# Patient Record
Sex: MALE | Race: BLACK OR AFRICAN AMERICAN | NOT HISPANIC OR LATINO | Employment: OTHER | URBAN - METROPOLITAN AREA
[De-identification: names, ages, dates, MRNs, and addresses within clinical notes are randomized per-mention and may not be internally consistent; named-entity substitution may affect disease eponyms.]

---

## 2021-01-07 ENCOUNTER — APPOINTMENT (OUTPATIENT)
Dept: LAB | Facility: CLINIC | Age: 66
End: 2021-01-07
Payer: MEDICARE

## 2021-01-07 ENCOUNTER — OFFICE VISIT (OUTPATIENT)
Dept: INTERNAL MEDICINE CLINIC | Facility: CLINIC | Age: 66
End: 2021-01-07
Payer: MEDICARE

## 2021-01-07 VITALS
TEMPERATURE: 96.8 F | DIASTOLIC BLOOD PRESSURE: 82 MMHG | WEIGHT: 201.6 LBS | HEIGHT: 70 IN | RESPIRATION RATE: 16 BRPM | HEART RATE: 64 BPM | BODY MASS INDEX: 28.86 KG/M2 | SYSTOLIC BLOOD PRESSURE: 128 MMHG

## 2021-01-07 DIAGNOSIS — Z23 ENCOUNTER FOR IMMUNIZATION: ICD-10-CM

## 2021-01-07 DIAGNOSIS — Z11.4 ENCOUNTER FOR SCREENING FOR HIV: ICD-10-CM

## 2021-01-07 DIAGNOSIS — F17.210 CIGARETTE NICOTINE DEPENDENCE WITHOUT COMPLICATION: ICD-10-CM

## 2021-01-07 DIAGNOSIS — M25.551 RIGHT HIP PAIN: ICD-10-CM

## 2021-01-07 DIAGNOSIS — Z00.00 HEALTHCARE MAINTENANCE: ICD-10-CM

## 2021-01-07 DIAGNOSIS — Z12.5 PROSTATE CANCER SCREENING: ICD-10-CM

## 2021-01-07 DIAGNOSIS — Z11.59 ENCOUNTER FOR HEPATITIS C SCREENING TEST FOR LOW RISK PATIENT: ICD-10-CM

## 2021-01-07 DIAGNOSIS — K43.9 VENTRAL HERNIA WITHOUT OBSTRUCTION OR GANGRENE: Primary | ICD-10-CM

## 2021-01-07 DIAGNOSIS — Z23 NEED FOR INFLUENZA VACCINATION: ICD-10-CM

## 2021-01-07 DIAGNOSIS — Z23 NEED FOR STREPTOCOCCUS PNEUMONIAE VACCINATION: ICD-10-CM

## 2021-01-07 DIAGNOSIS — M54.50 ACUTE RIGHT-SIDED LOW BACK PAIN WITHOUT SCIATICA: ICD-10-CM

## 2021-01-07 PROBLEM — K42.9 UMBILICAL HERNIA: Status: ACTIVE | Noted: 2021-01-07

## 2021-01-07 LAB
ALBUMIN SERPL BCP-MCNC: 4 G/DL (ref 3.5–5)
ALP SERPL-CCNC: 90 U/L (ref 46–116)
ALT SERPL W P-5'-P-CCNC: 33 U/L (ref 12–78)
ANION GAP SERPL CALCULATED.3IONS-SCNC: 2 MMOL/L (ref 4–13)
AST SERPL W P-5'-P-CCNC: 19 U/L (ref 5–45)
BILIRUB SERPL-MCNC: 0.59 MG/DL (ref 0.2–1)
BUN SERPL-MCNC: 16 MG/DL (ref 5–25)
CALCIUM SERPL-MCNC: 9.5 MG/DL (ref 8.3–10.1)
CHLORIDE SERPL-SCNC: 110 MMOL/L (ref 100–108)
CHOLEST SERPL-MCNC: 181 MG/DL (ref 50–200)
CO2 SERPL-SCNC: 29 MMOL/L (ref 21–32)
CREAT SERPL-MCNC: 1.09 MG/DL (ref 0.6–1.3)
ERYTHROCYTE [DISTWIDTH] IN BLOOD BY AUTOMATED COUNT: 15.2 % (ref 11.6–15.1)
GFR SERPL CREATININE-BSD FRML MDRD: 81 ML/MIN/1.73SQ M
GLUCOSE SERPL-MCNC: 105 MG/DL (ref 65–140)
HCT VFR BLD AUTO: 42.7 % (ref 36.5–49.3)
HCV AB SER QL: NORMAL
HDLC SERPL-MCNC: 55 MG/DL
HGB BLD-MCNC: 13.6 G/DL (ref 12–17)
LDLC SERPL CALC-MCNC: 113 MG/DL (ref 0–100)
MCH RBC QN AUTO: 28.2 PG (ref 26.8–34.3)
MCHC RBC AUTO-ENTMCNC: 31.9 G/DL (ref 31.4–37.4)
MCV RBC AUTO: 89 FL (ref 82–98)
PLATELET # BLD AUTO: 288 THOUSANDS/UL (ref 149–390)
PMV BLD AUTO: 11.2 FL (ref 8.9–12.7)
POTASSIUM SERPL-SCNC: 4.7 MMOL/L (ref 3.5–5.3)
PROT SERPL-MCNC: 8.3 G/DL (ref 6.4–8.2)
PSA SERPL-MCNC: 3.6 NG/ML (ref 0–4)
RBC # BLD AUTO: 4.82 MILLION/UL (ref 3.88–5.62)
SODIUM SERPL-SCNC: 141 MMOL/L (ref 136–145)
TRIGL SERPL-MCNC: 64 MG/DL
WBC # BLD AUTO: 4.81 THOUSAND/UL (ref 4.31–10.16)

## 2021-01-07 PROCEDURE — 85027 COMPLETE CBC AUTOMATED: CPT

## 2021-01-07 PROCEDURE — G0009 ADMIN PNEUMOCOCCAL VACCINE: HCPCS | Performed by: NURSE PRACTITIONER

## 2021-01-07 PROCEDURE — 99204 OFFICE O/P NEW MOD 45 MIN: CPT | Performed by: NURSE PRACTITIONER

## 2021-01-07 PROCEDURE — 36415 COLL VENOUS BLD VENIPUNCTURE: CPT

## 2021-01-07 PROCEDURE — G0008 ADMIN INFLUENZA VIRUS VAC: HCPCS | Performed by: NURSE PRACTITIONER

## 2021-01-07 PROCEDURE — G0103 PSA SCREENING: HCPCS

## 2021-01-07 PROCEDURE — 86803 HEPATITIS C AB TEST: CPT

## 2021-01-07 PROCEDURE — 80053 COMPREHEN METABOLIC PANEL: CPT

## 2021-01-07 PROCEDURE — 90670 PCV13 VACCINE IM: CPT | Performed by: NURSE PRACTITIONER

## 2021-01-07 PROCEDURE — 87389 HIV-1 AG W/HIV-1&-2 AB AG IA: CPT

## 2021-01-07 PROCEDURE — 90662 IIV NO PRSV INCREASED AG IM: CPT | Performed by: NURSE PRACTITIONER

## 2021-01-07 PROCEDURE — 80061 LIPID PANEL: CPT

## 2021-01-07 NOTE — ASSESSMENT & PLAN NOTE
The patient with a large ventral hernia  He had seen a previous provider who informed the patient that if he is asymptomatic nothing needs to be done  The patient is now interested in seeing a general surgeon to discuss surgical repair    Referral to general surgery was initiated

## 2021-01-07 NOTE — ASSESSMENT & PLAN NOTE
The patient complaining of right lumbar pain which radiates to the hip  He is unsure if the pain initiates in the hip or the back  He denies any mechanism of injury  An x-ray of the lumbar spine has been ordered

## 2021-01-07 NOTE — ASSESSMENT & PLAN NOTE
The patient has a several month history of right hip and upper thigh pain  He does not remember any mechanism of injury  He does take Tylenol or Motrin for this which does relieve the pain  Patient is a 48 year smoker  I did recommend to the patient that we get an x-ray of that right hip to rule out any osteo arthritis or bone issues

## 2021-01-07 NOTE — PROGRESS NOTES
the patient was not eligible for their annual wellness visit at this time Assessment and Plan:     Problem List Items Addressed This Visit     None      Visit Diagnoses     Encounter for immunization    -  Primary    Relevant Orders    influenza vaccine, high-dose, PF 0 7 mL (FLUZONE HIGH-DOSE)        BMI Counseling: Body mass index is 28 93 kg/m²  The BMI is above normal  Nutrition recommendations include encouraging healthy choices of fruits and vegetables, moderation in carbohydrate intake and reducing intake of cholesterol  Exercise recommendations include exercising 3-5 times per week  No pharmacotherapy was ordered  Tobacco Cessation Counseling: Tobacco cessation counseling was provided  The patient is sincerely urged to quit consumption of tobacco  He is not ready to quit tobacco  Medication options and side effects of medication discussed  Preventive health issues were discussed with patient, and age appropriate screening tests were ordered as noted in patient's After Visit Summary  Personalized health advice and appropriate referrals for health education or preventive services given if needed, as noted in patient's After Visit Summary  History of Present Illness:     Patient presents for Medicare Annual Wellness visit    Patient Care Team:  Sandra Fleming DO as PCP - General (Family Medicine)     Problem List:     There is no problem list on file for this patient  Past Medical and Surgical History:     History reviewed  No pertinent past medical history  History reviewed  No pertinent surgical history     Family History:     Family History   Problem Relation Age of Onset    No Known Problems Mother     No Known Problems Father     No Known Problems Brother     No Known Problems Maternal Grandmother     No Known Problems Maternal Grandfather     No Known Problems Paternal Grandmother     No Known Problems Paternal Grandfather       Social History:     E-Cigarette/Vaping    E-Cigarette Use Never User      E-Cigarette/Vaping Substances    Nicotine No     THC No     CBD No     Flavoring No     Other No     Unknown No      Social History     Socioeconomic History    Marital status: /Civil Union     Spouse name: None    Number of children: None    Years of education: None    Highest education level: None   Occupational History    None   Social Needs    Financial resource strain: None    Food insecurity     Worry: None     Inability: None    Transportation needs     Medical: None     Non-medical: None   Tobacco Use    Smoking status: Current Every Day Smoker     Types: Cigarettes    Smokeless tobacco: Never Used   Substance and Sexual Activity    Alcohol use: Yes     Frequency: 2-3 times a week     Comment: once a week    Drug use: Never    Sexual activity: Not Currently   Lifestyle    Physical activity     Days per week: 0 days     Minutes per session: 0 min    Stress: Not at all   Relationships    Social connections     Talks on phone: None     Gets together: None     Attends Mandaeism service: None     Active member of club or organization: None     Attends meetings of clubs or organizations: None     Relationship status: None    Intimate partner violence     Fear of current or ex partner: None     Emotionally abused: None     Physically abused: None     Forced sexual activity: None   Other Topics Concern    None   Social History Narrative    None      Medications and Allergies:     No current outpatient medications on file  No current facility-administered medications for this visit  No Known Allergies   Immunizations: There is no immunization history for the selected administration types on file for this patient     Health Maintenance:         Topic Date Due    Hepatitis C Screening  1955    Colorectal Cancer Screening  06/04/2004         Topic Date Due    DTaP,Tdap,and Td Vaccines (1 - Tdap) 06/04/1975    Pneumococcal Vaccine: 65+ Years (1 of 1 - PPSV23) 06/04/2019    Influenza Vaccine (1) 09/01/2020      Medicare Health Risk Assessment:     There were no vitals taken for this visit  Sakina Neff is here for his Initial Wellness visit  Health Risk Assessment:   Patient rates overall health as good  Patient feels that their physical health rating is slightly worse  Eyesight was rated as same  Hearing was rated as same  Patient feels that their emotional and mental health rating is same  Pain experienced in the last 7 days has been some  Patient states that he has experienced no weight loss or gain in last 6 months  Depression Screening:   PHQ-2 Score: 0      Fall Risk Screening: In the past year, patient has experienced: no history of falling in past year      Home Safety:  Patient does not have trouble with stairs inside or outside of their home  Patient has working smoke alarms and has working carbon monoxide detector  Home safety hazards include: none  Nutrition:   Current diet is Regular  Medications:   Patient is not currently taking any over-the-counter supplements  Patient is able to manage medications  Activities of Daily Living (ADLs)/Instrumental Activities of Daily Living (IADLs):   Walk and transfer into and out of bed and chair?: Yes  Dress and groom yourself?: Yes    Bathe or shower yourself?: Yes    Feed yourself?  Yes  Do your laundry/housekeeping?: Yes  Manage your money, pay your bills and track your expenses?: Yes  Make your own meals?: Yes    Do your own shopping?: Yes    Previous Hospitalizations:   Any hospitalizations or ED visits within the last 12 months?: No      Advance Care Planning:   Living will: No    Durable POA for healthcare: No    Five wishes given: Yes      Cognitive Screening:   Provider or family/friend/caregiver concerned regarding cognition?: No    PREVENTIVE SCREENINGS      Cardiovascular Screening:      Due for: Lipid Panel      Diabetes Screening:       Due for: Blood Glucose Prostate Cancer Screening:      Due for: PSA      Osteoporosis Screening:    General: Patient Declines      Abdominal Aortic Aneurysm (AAA) Screening:    Risk factors include: age between 73-69 yo and tobacco use      Due for: Screening AAA Ultrasound      Lung Cancer Screening:     General: Screening Not Indicated      EBEN Jensen

## 2021-01-07 NOTE — PROGRESS NOTES
INTERNAL MEDICINE INITIAL OFFICE VISIT  [facility]    NAME: Elva Ivy  AGE: 76 y o  SEX: male  : 1955     DATE: 2021     Assessment and Plan:     Problem List Items Addressed This Visit        Other    Right hip pain       The patient has a several month history of right hip and upper thigh pain  He does not remember any mechanism of injury  He does take Tylenol or Motrin for this which does relieve the pain  Patient is a 48 year smoker  I did recommend to the patient that we get an x-ray of that right hip to rule out any osteo arthritis or bone issues  Relevant Orders    XR hip/pelv 2-3 vws right if performed    Ventral hernia without obstruction or gangrene - Primary       The patient with a large ventral hernia  He had seen a previous provider who informed the patient that if he is asymptomatic nothing needs to be done  The patient is now interested in seeing a general surgeon to discuss surgical repair  Referral to general surgery was initiated         Relevant Orders    Ambulatory referral to General Surgery    Acute right-sided low back pain without sciatica      The patient complaining of right lumbar pain which radiates to the hip  He is unsure if the pain initiates in the hip or the back  He denies any mechanism of injury  An x-ray of the lumbar spine has been ordered  Relevant Orders    XR spine lumbar minimum 4 views non injury    Cigarette nicotine dependence without complication       The patient has been smoking cigarettes for approximately 50 years  He states he now only smokes approximately 3-4 cigarettes a day  He is not interested in smoking cessation  He does have a brother who  from lung cancer at the age of 52  His brother was a nonsmoker             Other Visit Diagnoses     Encounter for immunization        Relevant Orders    PNEUMOCOCCAL CONJUGATE VACCINE 13-VALENT GREATER THAN 6 MONTHS (Completed)    Prostate cancer screening        Relevant Orders    PSA, Total Screen    Healthcare maintenance        Relevant Orders    CBC    Comprehensive metabolic panel    Lipid Panel with Direct LDL reflex    Need for influenza vaccination        Relevant Orders    influenza vaccine, high-dose, PF 0 7 mL (FLUZONE HIGH-DOSE) (Completed)    Need for Streptococcus pneumoniae vaccination        Encounter for hepatitis C screening test for low risk patient        Relevant Orders    Hepatitis C antibody    Encounter for screening for HIV        Relevant Orders    HIV 1/2 Antigen/Antibody (4th Generation) w Reflex [facility]          Return in about 6 months (around 7/7/2021) for jose luis Mckeon  Chief Complaint:     Chief Complaint   Patient presents with    Establish Care    Back Pain    Hernia      History of Present Illness:     [place] to the office today to establish care  He has not seen a provider in many years  The patient is overdue for both vision and dental exams  The patient eats a fairly healthy diet  His BMI is slightly elevated at 28  The patient is up-to-date on colonoscopies and we will contact his gastroenterologist to obtain his last report from 2017  The patient did receive both an influenza vaccine as well as a pneumonia vaccine in the office today  The patient continues to smoke and is not interested in smoking cessation  He smokes approximately 3-4 cigarettes a day  The patient also drinks alcohol 2-3 days a week  He states he will drink either 3-4 cans of beer or 3 glasses of wine  The patient has a large ventral hernia and I have referred him to General surgery for recommendations for treatment  Blood work has been ordered for the patient  An x-ray of the lumbar spine and the right hip has also been ordered  I will contact the patient with those results      The following portions of the patient's history were reviewed and updated as appropriate: allergies, current medications, past family history, past medical history, past social history, past surgical history and problem list      Review of Systems:     Review of Systems   Constitutional: Negative  Negative for fatigue  HENT: Negative  Negative for congestion, postnasal drip, rhinorrhea and trouble swallowing  Eyes: Negative  Negative for visual disturbance  Respiratory: Negative  Negative for choking and shortness of breath  Cardiovascular: Negative  Negative for chest pain  Gastrointestinal: Positive for abdominal distention (hernia)  Endocrine: Negative  Genitourinary: Negative  Musculoskeletal: Positive for arthralgias and back pain  Negative for myalgias and neck pain  Skin: Negative  Neurological: Negative for dizziness and headaches  Psychiatric/Behavioral: Negative  Past Medical History:   History reviewed  No pertinent past medical history  Past Surgical History:   History reviewed  No pertinent surgical history  Social History:     Social History     Socioeconomic History    Marital status: /Civil Union     Spouse name: None    Number of children: None    Years of education: None    Highest education level: None   Occupational History    None   Social Needs    Financial resource strain: None    Food insecurity     Worry: None     Inability: None    Transportation needs     Medical: None     Non-medical: None   Tobacco Use    Smoking status: Current Every Day Smoker     Years: 50 00     Types: Cigarettes    Smokeless tobacco: Never Used    Tobacco comment: 2-3 cigarettes a day   Substance and Sexual Activity    Alcohol use:  Yes     Alcohol/week: 6 0 standard drinks     Types: 3 Cans of beer, 3 Glasses of wine per week     Frequency: 2-3 times a week     Drinks per session: 3 or 4     Comment: once a week    Drug use: Never    Sexual activity: Not Currently   Lifestyle    Physical activity     Days per week: 0 days     Minutes per session: 0 min    Stress: Not at all   Relationships    Social connections     Talks on phone: None     Gets together: None     Attends Yarsani service: None     Active member of club or organization: None     Attends meetings of clubs or organizations: None     Relationship status: None    Intimate partner violence     Fear of current or ex partner: None     Emotionally abused: None     Physically abused: None     Forced sexual activity: None   Other Topics Concern    None   Social History Narrative    None         Family History:     Family History   Problem Relation Age of Onset    No Known Problems Mother     No Known Problems Father     No Known Problems Brother     No Known Problems Maternal Grandmother     No Known Problems Maternal Grandfather     No Known Problems Paternal Grandmother     No Known Problems Paternal Grandfather         Current Medications:   No current outpatient medications on file  Allergies:   No Known Allergies     Physical Exam:     /82 (BP Location: Left arm, Patient Position: Sitting, Cuff Size: Standard)   Pulse 64   Temp (!) 96 8 °F (36 °C) (Tympanic)   Resp 16   Ht 5' 10" (1 778 m)   Wt 91 4 kg (201 lb 9 6 oz)   BMI 28 93 kg/m²     Physical Exam  Vitals signs reviewed  Constitutional:       General: He is not in acute distress  Appearance: Normal appearance  He is well-developed  HENT:      Head: Normocephalic and atraumatic  Right Ear: Tympanic membrane, ear canal and external ear normal  There is no impacted cerumen  Left Ear: External ear normal  There is impacted cerumen  Nose: Nose normal       Mouth/Throat:      Mouth: Mucous membranes are moist       Pharynx: No oropharyngeal exudate  Eyes:      General:         Right eye: No discharge  Left eye: No discharge  Conjunctiva/sclera: Conjunctivae normal       Pupils: Pupils are equal, round, and reactive to light     Neck:      Musculoskeletal: Normal range of motion and neck supple  Thyroid: No thyromegaly  Vascular: No JVD  Trachea: No tracheal deviation  Cardiovascular:      Rate and Rhythm: Normal rate and regular rhythm  Pulses: Normal pulses  Heart sounds: Normal heart sounds  No murmur  Pulmonary:      Effort: Pulmonary effort is normal  No respiratory distress  Breath sounds: Normal breath sounds  No wheezing  Abdominal:      General: Bowel sounds are normal  There is no distension  Palpations: Abdomen is soft  There is no mass  Tenderness: There is no abdominal tenderness  There is no guarding or rebound  Hernia: A hernia (ventral) is present  Musculoskeletal: Normal range of motion  General: Tenderness (right lumbar/right hip) present  Lymphadenopathy:      Cervical: No cervical adenopathy  Skin:     General: Skin is warm and dry  Capillary Refill: Capillary refill takes less than 2 seconds  Neurological:      Mental Status: He is alert and oriented to person, place, and time  Psychiatric:         Mood and Affect: Mood normal          Behavior: Behavior normal          Thought Content: Thought content normal          Judgment: Judgment normal      BMI Counseling: Body mass index is 28 93 kg/m²  The BMI is above normal  Nutrition recommendations include decreasing portion sizes, consuming healthier snacks, limiting drinks that contain sugar, moderation in carbohydrate intake and increasing intake of lean protein  Exercise recommendations include exercising 3-5 times per week  No pharmacotherapy was ordered  Tobacco Cessation Counseling: Tobacco cessation counseling was not provided  The patient is sincerely urged to quit consumption of tobacco  He is not ready to quit tobacco  Not interested in smoking cessation      Patient Instructions   Debrox ear drops to left ear for two weeks        EBEN Richardson  [facility] [address] [place] [address]

## 2021-01-07 NOTE — ASSESSMENT & PLAN NOTE
The patient has been smoking cigarettes for approximately 50 years  He states he now only smokes approximately 3-4 cigarettes a day  He is not interested in smoking cessation  He does have a brother who  from lung cancer at the age of 52  His brother was a nonsmoker

## 2021-01-08 ENCOUNTER — TELEPHONE (OUTPATIENT)
Dept: ADMINISTRATIVE | Facility: OTHER | Age: 66
End: 2021-01-08

## 2021-01-08 LAB — HIV 1+2 AB+HIV1 P24 AG SERPL QL IA: NORMAL

## 2021-01-08 NOTE — LETTER
Procedure Request Form: Colonoscopy      Date Requested: 21  Patient: [place] Eisen[place]  Patient : 1955   Referring Provider: Dyan Arauz, DO        Date of Procedure ______________________________       The above patient has informed us that they have completed their   most recent Colonoscopy at your facility  Please complete   this form and attach all corresponding procedure reports/results  Comments __________________________________________________________  ____________________________________________________________________  ____________________________________________________________________  ____________________________________________________________________    Facility Completing Procedure _________________________________________    Form Completed By (print name) _______________________________________      Signature __________________________________________________________      These reports are needed for  compliance    Please fax this completed form and a copy of the procedure report to our office located at [place] [address] as soon as possible to [phone] [facility] [place]: Phone [phone]    We thank you for your assistance in treating our mutual patient

## 2021-01-08 NOTE — TELEPHONE ENCOUNTER
Upon review of the In Basket request and the patient's chart, initial outreach has been made via fax, please see Contacts section for details       Thank you  Cassie Saint

## 2021-01-11 NOTE — TELEPHONE ENCOUNTER
Upon review of the In Basket request we were told that there is no record of patient at the practice  Any additional questions or concerns should be emailed to the Practice Liaisons via [email or web address] email, please do not reply via In Basket      Thank you  Arturo Ellington

## 2021-01-12 ENCOUNTER — TELEPHONE (OUTPATIENT)
Dept: INTERNAL MEDICINE CLINIC | Facility: CLINIC | Age: 66
End: 2021-01-12

## 2021-01-12 NOTE — TELEPHONE ENCOUNTER
----- Message from Guadalupe Ruiz [place] sent at 1/12/2021  8:25 AM EST -----  Please call the patient make him aware that his blood work is back and is within normal limits  The only abnormal is his LDL which is his bad cholesterol is slightly elevated  He should watch his intake of fatty foods

## 2021-01-16 NOTE — TELEPHONE ENCOUNTER
----- Message from Stephany Solis sent at 1/7/2021  3:22 PM EST -----  Regarding: Colonoscopy  01/07/21 3:23 PM    Hello, our patient Adelaida Hamilton has had CRC: Colonoscopy completed/performed  Please assist in updating the patient chart by making an External outreach to Dr Gildardo Matute facility located in [place]  The date of service is 2017      Thank you,  Stephany Oliver 63 normal...

## 2021-02-15 ENCOUNTER — CONSULT (OUTPATIENT)
Dept: SURGERY | Facility: CLINIC | Age: 66
End: 2021-02-15
Payer: MEDICARE

## 2021-02-15 ENCOUNTER — TRANSCRIBE ORDERS (OUTPATIENT)
Dept: ADMINISTRATIVE | Facility: HOSPITAL | Age: 66
End: 2021-02-15

## 2021-02-15 VITALS
HEIGHT: 70 IN | DIASTOLIC BLOOD PRESSURE: 80 MMHG | TEMPERATURE: 97 F | BODY MASS INDEX: 29.78 KG/M2 | WEIGHT: 208 LBS | SYSTOLIC BLOOD PRESSURE: 130 MMHG | HEART RATE: 69 BPM

## 2021-02-15 DIAGNOSIS — R19.00 MASS OF SOFT TISSUE OF ABDOMEN: Primary | ICD-10-CM

## 2021-02-15 DIAGNOSIS — R19.00 INTRA-ABDOMINAL AND PELVIC SWELLING, MASS AND LUMP, UNSPECIFIED SITE: ICD-10-CM

## 2021-02-15 DIAGNOSIS — D19.7 BENIGN NEOPLASM OF MESOTHELIAL TISSUE OF OTHER SITES: ICD-10-CM

## 2021-02-15 DIAGNOSIS — K43.9 VENTRAL HERNIA WITHOUT OBSTRUCTION OR GANGRENE: ICD-10-CM

## 2021-02-15 DIAGNOSIS — D19.7 BENIGN NEOPLASM OF MESOTHELIAL TISSUE OF OTHER SITES: Primary | ICD-10-CM

## 2021-02-15 DIAGNOSIS — K42.0 INCARCERATED UMBILICAL HERNIA: ICD-10-CM

## 2021-02-15 PROCEDURE — 99203 OFFICE O/P NEW LOW 30 MIN: CPT | Performed by: SURGERY

## 2021-02-15 NOTE — PROGRESS NOTES
Consult- General Surgery   Sriram Pearce 76 y o  male MRN: [id number]  Unit/Bed#:  Encounter: [id number]    Assessment/Plan     Assessment:  Soft tissue mass abdominal wall, suspect lipoma   Incarcerated umbilical hernia , asymptomatic  Plan:   I will order an ultrasound of the soft tissue mass from the abdominal wall, further recommendations will be made pending on the results  History of Present Illness     HPI:  Sriram Pearce is a 76 y o  male who presents   To my office for evaluation of abdominal wall hernia  The patient noted a lump from the abdominal wall for many years, he does not recall for how long, but he thinks it happened when he helped push a car many years ago  The lump sometimes is bigger, denies having any pain, discomfort, change in the color of skin, redness, fluctuation or any other constitutional symptoms  He went to see his primary care physician and he was referred to us for surgical evaluation  Review of Systems   Constitutional: Negative for chills and fever  HENT: Negative for nosebleeds and sore throat  Eyes: Negative for pain and discharge  Respiratory: Negative for cough and shortness of breath  Cardiovascular: Negative for chest pain and palpitations  Gastrointestinal: Negative for abdominal pain, constipation and diarrhea  Endocrine: Negative for cold intolerance and heat intolerance  Genitourinary: Negative for dysuria and hematuria  Neurological: Negative for seizures and headaches  Hematological: Negative for adenopathy  Does not bruise/bleed easily  Psychiatric/Behavioral: Negative for confusion  The patient is not nervous/anxious  Historical Information   History reviewed  No pertinent past medical history  History reviewed  No pertinent surgical history    Social History   Social History     Substance and Sexual Activity   Alcohol Use Yes    Alcohol/week: 6 0 standard drinks    Types: 3 Cans of beer, 3 Glasses of wine per week    Frequency: 2-3 times a week    Drinks per session: 3 or 4    Comment: once a week     Social History     Substance and Sexual Activity   Drug Use Never     Social History     Tobacco Use   Smoking Status Current Every Day Smoker    Years: 50 00    Types: Cigarettes   Smokeless Tobacco Never Used   Tobacco Comment    2-3 cigarettes a day     Family History: non-contributory    Meds/Allergies   all medications and allergies reviewed   No current outpatient medications on file  No Known Allergies    Objective     Current Vitals:   Blood Pressure: 130/80 (02/15/21 1318)  Pulse: 69 (02/15/21 1318)  Temperature: (!) 97 °F (36 1 °C) (02/15/21 1318)  Temp Source: Temporal (02/15/21 1318)  Height: 5' 10" (177 8 cm) (02/15/21 1318)  Weight - Scale: 94 3 kg (208 lb) (02/15/21 1318)      Physical Exam  Vitals signs and nursing note reviewed  Constitutional:       General: He is not in acute distress  Cardiovascular:      Rate and Rhythm: Normal rate and regular rhythm  Heart sounds: No murmur  Pulmonary:      Effort: No respiratory distress  Breath sounds: Normal breath sounds  Abdominal:      Palpations: Abdomen is soft  Tenderness: There is no abdominal tenderness  Comments: There is a 4 cm soft tissue mass  On the right rectus muscle, it moves with the muscle, non reducible, nontender to palpation, no skin color changes  There is no obvious visceromegaly noted  In addition the patient has small incarcerated umbilical hernia, nontender to palpation, no skin color changes  Skin:     Coloration: Skin is not jaundiced  Findings: No erythema or rash  Neurological:      Mental Status: He is alert and oriented to person, place, and time  Cranial Nerves: No cranial nerve deficit     Psychiatric:         Mood and Affect: Mood normal          Behavior: Behavior normal

## 2021-02-15 NOTE — PROGRESS NOTES
Assessment/Plan:    No problem-specific Assessment & Plan notes found for this encounter  Subjective: ventral hernia     Patient ID: Alanis Ortega is a 76 y o  male  Objective: There were no vitals taken for this visit           Physical Exam

## 2021-03-10 DIAGNOSIS — Z23 ENCOUNTER FOR IMMUNIZATION: ICD-10-CM

## 2021-03-13 ENCOUNTER — APPOINTMENT (EMERGENCY)
Dept: RADIOLOGY | Facility: HOSPITAL | Age: 66
DRG: 204 | End: 2021-03-13
Payer: MEDICARE

## 2021-03-13 ENCOUNTER — HOSPITAL ENCOUNTER (EMERGENCY)
Facility: HOSPITAL | Age: 66
Discharge: HOME | DRG: 204 | End: 2021-03-14
Attending: EMERGENCY MEDICINE | Admitting: EMERGENCY MEDICINE
Payer: MEDICARE

## 2021-03-13 DIAGNOSIS — R06.00 DYSPNEA: Primary | ICD-10-CM

## 2021-03-13 LAB
ALBUMIN SERPL BCP-MCNC: 4.1 G/DL (ref 3.5–5)
ALP SERPL-CCNC: 84 U/L (ref 46–116)
ALT SERPL W P-5'-P-CCNC: 41 U/L (ref 12–78)
ANION GAP SERPL CALCULATED.3IONS-SCNC: 9 MMOL/L (ref 4–13)
AST SERPL W P-5'-P-CCNC: 28 U/L (ref 5–45)
BASOPHILS # BLD AUTO: 0.04 THOUSANDS/ΜL (ref 0–0.1)
BASOPHILS NFR BLD AUTO: 1 % (ref 0–1)
BILIRUB SERPL-MCNC: 0.51 MG/DL (ref 0.2–1)
BUN SERPL-MCNC: 11 MG/DL (ref 5–25)
CALCIUM SERPL-MCNC: 9.1 MG/DL (ref 8.3–10.1)
CHLORIDE SERPL-SCNC: 105 MMOL/L (ref 100–108)
CO2 SERPL-SCNC: 29 MMOL/L (ref 21–32)
CREAT SERPL-MCNC: 1.02 MG/DL (ref 0.6–1.3)
EOSINOPHIL # BLD AUTO: 0.18 THOUSAND/ΜL (ref 0–0.61)
EOSINOPHIL NFR BLD AUTO: 4 % (ref 0–6)
ERYTHROCYTE [DISTWIDTH] IN BLOOD BY AUTOMATED COUNT: 15.3 % (ref 11.6–15.1)
GFR SERPL CREATININE-BSD FRML MDRD: 88 ML/MIN/1.73SQ M
GLUCOSE SERPL-MCNC: 91 MG/DL (ref 65–140)
HCT VFR BLD AUTO: 44 % (ref 36.5–49.3)
HGB BLD-MCNC: 14.2 G/DL (ref 12–17)
IMM GRANULOCYTES # BLD AUTO: 0.01 THOUSAND/UL (ref 0–0.2)
IMM GRANULOCYTES NFR BLD AUTO: 0 % (ref 0–2)
LYMPHOCYTES # BLD AUTO: 2.04 THOUSANDS/ΜL (ref 0.6–4.47)
LYMPHOCYTES NFR BLD AUTO: 40 % (ref 14–44)
MCH RBC QN AUTO: 28.1 PG (ref 26.8–34.3)
MCHC RBC AUTO-ENTMCNC: 32.3 G/DL (ref 31.4–37.4)
MCV RBC AUTO: 87 FL (ref 82–98)
MONOCYTES # BLD AUTO: 0.41 THOUSAND/ΜL (ref 0.17–1.22)
MONOCYTES NFR BLD AUTO: 8 % (ref 4–12)
NEUTROPHILS # BLD AUTO: 2.37 THOUSANDS/ΜL (ref 1.85–7.62)
NEUTS SEG NFR BLD AUTO: 47 % (ref 43–75)
NRBC BLD AUTO-RTO: 0 /100 WBCS
NT-PROBNP SERPL-MCNC: 31 PG/ML
PLATELET # BLD AUTO: 295 THOUSANDS/UL (ref 149–390)
PMV BLD AUTO: 10.4 FL (ref 8.9–12.7)
POTASSIUM SERPL-SCNC: 4.3 MMOL/L (ref 3.5–5.3)
PROT SERPL-MCNC: 8.5 G/DL (ref 6.4–8.2)
RBC # BLD AUTO: 5.06 MILLION/UL (ref 3.88–5.62)
SODIUM SERPL-SCNC: 143 MMOL/L (ref 136–145)
TROPONIN I SERPL-MCNC: <0.02 NG/ML
WBC # BLD AUTO: 5.05 THOUSAND/UL (ref 4.31–10.16)

## 2021-03-13 PROCEDURE — 93005 ELECTROCARDIOGRAM TRACING: CPT

## 2021-03-13 PROCEDURE — 80053 COMPREHEN METABOLIC PANEL: CPT | Performed by: EMERGENCY MEDICINE

## 2021-03-13 PROCEDURE — 84484 ASSAY OF TROPONIN QUANT: CPT | Performed by: EMERGENCY MEDICINE

## 2021-03-13 PROCEDURE — 85025 COMPLETE CBC W/AUTO DIFF WBC: CPT | Performed by: EMERGENCY MEDICINE

## 2021-03-13 PROCEDURE — 71046 X-RAY EXAM CHEST 2 VIEWS: CPT

## 2021-03-13 PROCEDURE — 99284 EMERGENCY DEPT VISIT MOD MDM: CPT | Performed by: EMERGENCY MEDICINE

## 2021-03-13 PROCEDURE — 99284 EMERGENCY DEPT VISIT MOD MDM: CPT

## 2021-03-13 PROCEDURE — 36415 COLL VENOUS BLD VENIPUNCTURE: CPT | Performed by: EMERGENCY MEDICINE

## 2021-03-13 PROCEDURE — 83880 ASSAY OF NATRIURETIC PEPTIDE: CPT | Performed by: EMERGENCY MEDICINE

## 2021-03-13 RX ORDER — PREDNISONE 20 MG/1
40 TABLET ORAL DAILY
Qty: 10 TABLET | Refills: 0 | Status: SHIPPED | OUTPATIENT
Start: 2021-03-13 | End: 2021-03-15

## 2021-03-13 RX ORDER — PREDNISONE 20 MG/1
40 TABLET ORAL DAILY
Qty: 10 TABLET | Refills: 0 | Status: SHIPPED | OUTPATIENT
Start: 2021-03-13 | End: 2021-03-13 | Stop reason: SDUPTHER

## 2021-03-13 RX ORDER — ALBUTEROL SULFATE 90 UG/1
2 AEROSOL, METERED RESPIRATORY (INHALATION) EVERY 4 HOURS PRN
Qty: 1 INHALER | Refills: 0 | Status: SHIPPED | OUTPATIENT
Start: 2021-03-13 | End: 2021-03-15

## 2021-03-14 ENCOUNTER — HOSPITAL ENCOUNTER (INPATIENT)
Facility: HOSPITAL | Age: 66
LOS: 1 days | Discharge: HOME | DRG: 204 | End: 2021-03-16
Attending: EMERGENCY MEDICINE | Admitting: INTERNAL MEDICINE
Payer: MEDICARE

## 2021-03-14 ENCOUNTER — APPOINTMENT (EMERGENCY)
Dept: RADIOLOGY | Facility: HOSPITAL | Age: 66
DRG: 204 | End: 2021-03-14
Payer: MEDICARE

## 2021-03-14 VITALS
HEIGHT: 70 IN | DIASTOLIC BLOOD PRESSURE: 76 MMHG | HEART RATE: 61 BPM | BODY MASS INDEX: 29.92 KG/M2 | SYSTOLIC BLOOD PRESSURE: 156 MMHG | WEIGHT: 209 LBS | OXYGEN SATURATION: 100 % | RESPIRATION RATE: 22 BRPM | TEMPERATURE: 97.8 F

## 2021-03-14 DIAGNOSIS — F41.9 ANXIETY: ICD-10-CM

## 2021-03-14 DIAGNOSIS — R07.9 CHEST PAIN: Primary | ICD-10-CM

## 2021-03-14 DIAGNOSIS — R94.31 EKG ABNORMALITIES: ICD-10-CM

## 2021-03-14 LAB
ALBUMIN SERPL BCP-MCNC: 3.5 G/DL (ref 3.5–5)
ALP SERPL-CCNC: 78 U/L (ref 46–116)
ALT SERPL W P-5'-P-CCNC: 39 U/L (ref 12–78)
ANION GAP SERPL CALCULATED.3IONS-SCNC: 12 MMOL/L (ref 4–13)
AST SERPL W P-5'-P-CCNC: 23 U/L (ref 5–45)
ATRIAL RATE: 66 BPM
ATRIAL RATE: 68 BPM
BASOPHILS # BLD AUTO: 0.02 THOUSANDS/ΜL (ref 0–0.1)
BASOPHILS NFR BLD AUTO: 0 % (ref 0–1)
BILIRUB SERPL-MCNC: 0.42 MG/DL (ref 0.2–1)
BUN SERPL-MCNC: 19 MG/DL (ref 5–25)
CALCIUM SERPL-MCNC: 8.4 MG/DL (ref 8.3–10.1)
CHLORIDE SERPL-SCNC: 106 MMOL/L (ref 100–108)
CO2 SERPL-SCNC: 24 MMOL/L (ref 21–32)
CREAT SERPL-MCNC: 1.24 MG/DL (ref 0.6–1.3)
D DIMER PPP FEU-MCNC: 0.36 UG/ML FEU
EOSINOPHIL # BLD AUTO: 0.12 THOUSAND/ΜL (ref 0–0.61)
EOSINOPHIL NFR BLD AUTO: 2 % (ref 0–6)
ERYTHROCYTE [DISTWIDTH] IN BLOOD BY AUTOMATED COUNT: 15.2 % (ref 11.6–15.1)
GFR SERPL CREATININE-BSD FRML MDRD: 70 ML/MIN/1.73SQ M
GLUCOSE SERPL-MCNC: 120 MG/DL (ref 65–140)
HCT VFR BLD AUTO: 40.2 % (ref 36.5–49.3)
HGB BLD-MCNC: 13 G/DL (ref 12–17)
IMM GRANULOCYTES # BLD AUTO: 0.02 THOUSAND/UL (ref 0–0.2)
IMM GRANULOCYTES NFR BLD AUTO: 0 % (ref 0–2)
LYMPHOCYTES # BLD AUTO: 1.92 THOUSANDS/ΜL (ref 0.6–4.47)
LYMPHOCYTES NFR BLD AUTO: 37 % (ref 14–44)
MCH RBC QN AUTO: 28 PG (ref 26.8–34.3)
MCHC RBC AUTO-ENTMCNC: 32.3 G/DL (ref 31.4–37.4)
MCV RBC AUTO: 87 FL (ref 82–98)
MONOCYTES # BLD AUTO: 0.55 THOUSAND/ΜL (ref 0.17–1.22)
MONOCYTES NFR BLD AUTO: 11 % (ref 4–12)
NEUTROPHILS # BLD AUTO: 2.55 THOUSANDS/ΜL (ref 1.85–7.62)
NEUTS SEG NFR BLD AUTO: 50 % (ref 43–75)
NRBC BLD AUTO-RTO: 0 /100 WBCS
P AXIS: 34 DEGREES
P AXIS: 49 DEGREES
PLATELET # BLD AUTO: 274 THOUSANDS/UL (ref 149–390)
PMV BLD AUTO: 11 FL (ref 8.9–12.7)
POTASSIUM SERPL-SCNC: 3.6 MMOL/L (ref 3.5–5.3)
PR INTERVAL: 158 MS
PR INTERVAL: 166 MS
PROT SERPL-MCNC: 7.2 G/DL (ref 6.4–8.2)
QRS AXIS: -43 DEGREES
QRS AXIS: -49 DEGREES
QRSD INTERVAL: 154 MS
QRSD INTERVAL: 158 MS
QT INTERVAL: 440 MS
QT INTERVAL: 450 MS
QTC INTERVAL: 467 MS
QTC INTERVAL: 471 MS
RBC # BLD AUTO: 4.65 MILLION/UL (ref 3.88–5.62)
SODIUM SERPL-SCNC: 142 MMOL/L (ref 136–145)
T WAVE AXIS: 35 DEGREES
T WAVE AXIS: 35 DEGREES
TROPONIN I SERPL-MCNC: <0.02 NG/ML
VENTRICULAR RATE: 66 BPM
VENTRICULAR RATE: 68 BPM
WBC # BLD AUTO: 5.18 THOUSAND/UL (ref 4.31–10.16)

## 2021-03-14 PROCEDURE — 36415 COLL VENOUS BLD VENIPUNCTURE: CPT | Performed by: EMERGENCY MEDICINE

## 2021-03-14 PROCEDURE — 99285 EMERGENCY DEPT VISIT HI MDM: CPT

## 2021-03-14 PROCEDURE — 93010 ELECTROCARDIOGRAM REPORT: CPT | Performed by: INTERNAL MEDICINE

## 2021-03-14 PROCEDURE — 85379 FIBRIN DEGRADATION QUANT: CPT | Performed by: EMERGENCY MEDICINE

## 2021-03-14 PROCEDURE — 80053 COMPREHEN METABOLIC PANEL: CPT | Performed by: EMERGENCY MEDICINE

## 2021-03-14 PROCEDURE — 1124F ACP DISCUSS-NO DSCNMKR DOCD: CPT | Performed by: EMERGENCY MEDICINE

## 2021-03-14 PROCEDURE — 93005 ELECTROCARDIOGRAM TRACING: CPT

## 2021-03-14 PROCEDURE — 99284 EMERGENCY DEPT VISIT MOD MDM: CPT | Performed by: EMERGENCY MEDICINE

## 2021-03-14 PROCEDURE — 85025 COMPLETE CBC W/AUTO DIFF WBC: CPT | Performed by: EMERGENCY MEDICINE

## 2021-03-14 PROCEDURE — 94640 AIRWAY INHALATION TREATMENT: CPT

## 2021-03-14 PROCEDURE — 71045 X-RAY EXAM CHEST 1 VIEW: CPT

## 2021-03-14 PROCEDURE — 84484 ASSAY OF TROPONIN QUANT: CPT | Performed by: EMERGENCY MEDICINE

## 2021-03-14 RX ORDER — ASPIRIN 81 MG/1
324 TABLET, CHEWABLE ORAL ONCE
Status: COMPLETED | OUTPATIENT
Start: 2021-03-14 | End: 2021-03-14

## 2021-03-14 RX ORDER — ALBUTEROL SULFATE 2.5 MG/3ML
5 SOLUTION RESPIRATORY (INHALATION) ONCE
Status: COMPLETED | OUTPATIENT
Start: 2021-03-14 | End: 2021-03-14

## 2021-03-14 RX ADMIN — IPRATROPIUM BROMIDE 0.5 MG: 0.5 SOLUTION RESPIRATORY (INHALATION) at 21:37

## 2021-03-14 RX ADMIN — ASPIRIN 324 MG: 81 TABLET, CHEWABLE ORAL at 21:39

## 2021-03-14 RX ADMIN — ALBUTEROL SULFATE 5 MG: 2.5 SOLUTION RESPIRATORY (INHALATION) at 21:37

## 2021-03-15 ENCOUNTER — APPOINTMENT (INPATIENT)
Dept: NON INVASIVE DIAGNOSTICS | Facility: HOSPITAL | Age: 66
DRG: 204 | End: 2021-03-15
Payer: MEDICARE

## 2021-03-15 PROBLEM — R06.01 ORTHOPNEA: Status: ACTIVE | Noted: 2021-03-15

## 2021-03-15 LAB
ATRIAL RATE: 61 BPM
ATRIAL RATE: 64 BPM
P AXIS: 45 DEGREES
P AXIS: 47 DEGREES
PR INTERVAL: 164 MS
PR INTERVAL: 172 MS
QRS AXIS: -35 DEGREES
QRS AXIS: -41 DEGREES
QRSD INTERVAL: 156 MS
QRSD INTERVAL: 164 MS
QT INTERVAL: 450 MS
QT INTERVAL: 460 MS
QTC INTERVAL: 463 MS
QTC INTERVAL: 464 MS
T WAVE AXIS: 19 DEGREES
T WAVE AXIS: 32 DEGREES
TROPONIN I SERPL-MCNC: <0.02 NG/ML
TROPONIN I SERPL-MCNC: <0.02 NG/ML
VENTRICULAR RATE: 61 BPM
VENTRICULAR RATE: 64 BPM

## 2021-03-15 PROCEDURE — 84484 ASSAY OF TROPONIN QUANT: CPT | Performed by: STUDENT IN AN ORGANIZED HEALTH CARE EDUCATION/TRAINING PROGRAM

## 2021-03-15 PROCEDURE — 93306 TTE W/DOPPLER COMPLETE: CPT

## 2021-03-15 PROCEDURE — 36415 COLL VENOUS BLD VENIPUNCTURE: CPT | Performed by: STUDENT IN AN ORGANIZED HEALTH CARE EDUCATION/TRAINING PROGRAM

## 2021-03-15 PROCEDURE — 93010 ELECTROCARDIOGRAM REPORT: CPT | Performed by: INTERNAL MEDICINE

## 2021-03-15 PROCEDURE — 84484 ASSAY OF TROPONIN QUANT: CPT | Performed by: PHYSICIAN ASSISTANT

## 2021-03-15 PROCEDURE — 93306 TTE W/DOPPLER COMPLETE: CPT | Performed by: INTERNAL MEDICINE

## 2021-03-15 PROCEDURE — 93005 ELECTROCARDIOGRAM TRACING: CPT

## 2021-03-15 PROCEDURE — 99220 PR INITIAL OBSERVATION CARE/DAY 70 MINUTES: CPT | Performed by: INTERNAL MEDICINE

## 2021-03-15 RX ORDER — ALBUTEROL SULFATE 2.5 MG/3ML
2.5 SOLUTION RESPIRATORY (INHALATION) EVERY 6 HOURS PRN
Status: DISCONTINUED | OUTPATIENT
Start: 2021-03-15 | End: 2021-03-15

## 2021-03-15 RX ORDER — DOCUSATE SODIUM 100 MG/1
100 CAPSULE, LIQUID FILLED ORAL 2 TIMES DAILY PRN
Status: DISCONTINUED | OUTPATIENT
Start: 2021-03-15 | End: 2021-03-16 | Stop reason: HOSPADM

## 2021-03-15 RX ORDER — PREDNISONE 20 MG/1
40 TABLET ORAL DAILY
Status: DISCONTINUED | OUTPATIENT
Start: 2021-03-15 | End: 2021-03-16 | Stop reason: HOSPADM

## 2021-03-15 RX ORDER — ONDANSETRON 2 MG/ML
4 INJECTION INTRAMUSCULAR; INTRAVENOUS EVERY 6 HOURS PRN
Status: DISCONTINUED | OUTPATIENT
Start: 2021-03-15 | End: 2021-03-16 | Stop reason: HOSPADM

## 2021-03-15 RX ORDER — ALBUTEROL SULFATE 90 UG/1
2 AEROSOL, METERED RESPIRATORY (INHALATION) EVERY 6 HOURS PRN
Status: DISCONTINUED | OUTPATIENT
Start: 2021-03-15 | End: 2021-03-16 | Stop reason: HOSPADM

## 2021-03-15 RX ORDER — MAGNESIUM HYDROXIDE/ALUMINUM HYDROXICE/SIMETHICONE 120; 1200; 1200 MG/30ML; MG/30ML; MG/30ML
30 SUSPENSION ORAL EVERY 6 HOURS PRN
Status: DISCONTINUED | OUTPATIENT
Start: 2021-03-15 | End: 2021-03-16 | Stop reason: HOSPADM

## 2021-03-15 RX ORDER — ACETAMINOPHEN 325 MG/1
650 TABLET ORAL EVERY 6 HOURS PRN
Status: DISCONTINUED | OUTPATIENT
Start: 2021-03-15 | End: 2021-03-16 | Stop reason: HOSPADM

## 2021-03-15 RX ADMIN — PREDNISONE 40 MG: 20 TABLET ORAL at 17:21

## 2021-03-15 RX ADMIN — ENOXAPARIN SODIUM 40 MG: 40 INJECTION SUBCUTANEOUS at 09:56

## 2021-03-15 NOTE — ASSESSMENT & PLAN NOTE
· Patient reports constant orthopnea over the last 2 weeks  He reports it is worse with lying down and help wake up in the middle of the night and have severe shortness of breath and will have to sit upper walk around for it to subside  Denies any dyspnea on exertion reports usually walking around helps the shortness of breath go way  · Denies anxiety, cardiac history including congestive heart failure, CAD, COPD or asthma  · Was evaluated at the ED on 03/13 and was given p r n  Albuterol inhaler and prednisone patient reports he only took his 1st prednisone dose just prior to come to the ED tonight, but came back because orthopnea still persistent  · Chest x-ray negative on both 3/13 in 3/14  · Saturating well at 98% on room air  · EKG revealing borderline QTC, normal sinus rhythm, right bundle branch block, LVH, no ST elevation, no significant change compared to 03/13 EKG  · D-dimer negative, troponin x2 negative, BNP WNL at 3 1  · Patient does admit to being a tobacco smoker smoking about 10 cigarettes a week, rule out if related to new onset COPD versus cardiac issue? · Discussed with patient about following up outpatient with Cardiology  · For now will trend troponin 1 more time q 3 hours with EKG despite patient denying any chest pain  · P r n  Albuterol inhaler and nebulizer  · Finish prednisone 40 mg q d   Taper for 4 more days, incentive spirometry

## 2021-03-15 NOTE — ASSESSMENT & PLAN NOTE
· Smokes 10 cigarettes per week  · Discuss benefit of tobacco cessation  · Provide with tobacco cessation Education materials upon discharge

## 2021-03-15 NOTE — UTILIZATION REVIEW
Initial Clinical Review    OBS order 3/14 2229 converted to IP on 3/15 @ 1100      Level of Care Med Surg    Estimated length of stay More than 2 Midnights    Certification I certify that inpatient services are medically necessary for this patient for a duration of greater than two midnights  See H&P and MD Progress Notes for additional information about the patient's course of treatment  ED Arrival Information     Expected Arrival Acuity Means of Arrival Escorted By Service Admission Type    - 3/14/2021 21:09 Urgent Walk-In Family Member  (daughter) Hospitalist Urgent    Arrival Complaint    sob        Chief Complaint   Patient presents with    Shortness of Breath     pt c/o icnreased sob this afternoon, pt states being here yesterday and took prescribed medications with no relief  pt denies any cardiac/resp issues     Assessment/Plan: Orthopnea  Assessment & Plan  · Patient reports constant orthopnea over the last 2 weeks  He reports it is worse with lying down and help wake up in the middle of the night and have severe shortness of breath and will have to sit upper walk around for it to subside  Denies any dyspnea on exertion reports usually walking around helps the shortness of breath go way  · Denies anxiety, cardiac history including congestive heart failure, CAD, COPD or asthma  · Was evaluated at the ED on 03/13 and was given p r n   Albuterol inhaler and prednisone patient reports he only took his 1st prednisone dose just prior to come to the ED tonight, but came back because orthopnea still persistent  · Chest x-ray negative on both 3/13 in 3/14  · Saturating well at 98% on room air  · EKG revealing borderline QTC, normal sinus rhythm, right bundle branch block, LVH, no ST elevation, no significant change compared to 03/13 EKG  · D-dimer negative, troponin x2 negative, BNP WNL at 3 1  · Patient does admit to being a tobacco smoker smoking about 10 cigarettes a week, rule out if related to new onset COPD versus cardiac issue? · Discussed with patient about following up outpatient with Cardiology  · For now will trend troponin 1 more time q 3 hours with EKG despite patient denying any chest pain  · P r n  Albuterol inhaler and nebulizer  · Finish prednisone 40 mg q d  Taper for 4 more days, incentive spirometry     Cigarette nicotine dependence without complication  Assessment & Plan  · Smokes 10 cigarettes per week  · Discuss benefit of tobacco cessation  · Provide with tobacco cessation Education materials upon discharge        VTE Prophylaxis: Enoxaparin (Lovenox)  / sequential compression device   Code Status:  Level 1, full code  POLST: POLST form is not discussed and not completed at this time         Anticipated Length of Stay:  Patient will be admitted on an Observation basis with an anticipated length of stay of  less than 2 midnights  Justification for Hospital Stay:  See above     Total Time for Visit, including Counseling / Coordination of Care: 1 hour  Greater than 50% of this total time spent on direct patient counseling and coordination of care      Chief Complaint:           Chief Complaint   Patient presents with    Shortness of Breath       pt c/o icnreased sob this afternoon, pt states being here yesterday and took prescribed medications with no relief  pt denies any cardiac/resp issues         History of Present Illness:     Elsa Ayala is a 76 y o  male with  PMH not limited to tobacco use disorder who presents with complaint of constant orthopnea x2 weeks  Denies any gradual worsening  Was evaluated in ED on 03/13 and was given 5 day course of prednisone and inhaler  Patient reports he returned because there has been no change in his orthopnea, but he also reports he only took his 1st dose of the prednisone taper just prior to coming to the ED tonight  Does admit to tobacco use  Denies any history of COPD or asthma, any cardiac issues including heart failure    Reports shortness of breath is mostly at night when he lays down wakes up in the middle night  Reports what does help relieve it is walking around or sitting up  Denies any dyspnea on exertion  Denies any increase in anxiety    Denies chest pain, dyspnea, headaches or dizziness        ED Triage Vitals   Temperature Pulse Respirations Blood Pressure SpO2   03/14/21 2115 03/14/21 2114 03/14/21 2114 03/14/21 2114 03/14/21 2113   98 1 °F (36 7 °C) 70 22 159/72 98 %      Temp Source Heart Rate Source Patient Position - Orthostatic VS BP Location FiO2 (%)   03/14/21 2115 03/14/21 2114 03/14/21 2114 03/14/21 2114 --   Oral Monitor Sitting Right arm       Pain Score       03/15/21 0046       No Pain          Wt Readings from Last 1 Encounters:   03/15/21 94 8 kg (208 lb 15 9 oz)     Additional Vital Signs:   03/15/21 0600  --  64  20  115/56  80  99 %  None (Room air)  Lying   03/15/21 0400  --  59  20  122/56  80  97 %  None (Room air)  Sitting   03/15/21 0046  --  71  20  144/80  --  98 %  None (Room air)  Sitting   03/14/21 2115  98 1 °F (36 7 °C)  --  --  --  --  --  --  --   03/14/21 2114  --  70  22  159/72  --  99 %  None (Room air         Pertinent Labs/Diagnostic Test Results:   3/14 [facility]   3/14 EKG NSR RBBB    Results from last 7 days   Lab Units 03/14/21 2127 03/13/21  2230   WBC Thousand/uL 5 18 5 05   HEMOGLOBIN g/dL 13 0 14 2   HEMATOCRIT % 40 2 44 0   PLATELETS Thousands/uL 274 295   NEUTROS ABS Thousands/µL 2 55 2 37     Results from last 7 days   Lab Units 03/14/21 2127 03/13/21  2230   SODIUM mmol/L 142 143   POTASSIUM mmol/L 3 6 4 3   CHLORIDE mmol/L 106 105   CO2 mmol/L 24 29   ANION GAP mmol/L 12 9   BUN mg/dL 19 11   CREATININE mg/dL 1 24 1 02   EGFR ml/min/1 73sq m 70 88   CALCIUM mg/dL 8 4 9 1     Results from last 7 days   Lab Units 03/14/21 2127 03/13/21  2230   AST U/L 23 28   ALT U/L 39 41   ALK PHOS U/L 78 84   TOTAL PROTEIN g/dL 7 2 8 5*   ALBUMIN g/dL 3 5 4 1   TOTAL BILIRUBIN mg/dL 0 42 0 51 Results from last 7 days   Lab Units 03/14/21 2127 03/13/21  2230   GLUCOSE RANDOM mg/dL 120 91     Results from last 7 days   Lab Units 03/15/21  0441 03/15/21  0049 03/14/21 2127 03/13/21  2230   TROPONIN I ng/mL <0 02 <0 02 <0 02 <0 02     Results from last 7 days   Lab Units 03/14/21 2127   D-DIMER QUANTITATIVE ug/ml FEU 0 36     Results from last 7 days   Lab Units 03/13/21  2230   NT-PRO BNP pg/mL 31       ED Treatment:   Medication Administration from 03/14/2021 2108 to 03/15/2021 0730       Date/Time Order Dose Route Action     03/14/2021 2137 albuterol inhalation solution 5 mg 5 mg Nebulization Given     03/14/2021 2137 ipratropium (ATROVENT) 0 02 % inhalation solution 0 5 mg 0 5 mg Nebulization Given     03/14/2021 2139 aspirin chewable tablet 324 mg 324 mg Oral Given        History reviewed  No pertinent past medical history  Present on Admission:   Orthopnea   Cigarette nicotine dependence without complication      Admitting Diagnosis: SOB (shortness of breath) [R06 02]  Age/Sex: 76 y o  male  Admission Orders:  Scheduled Medications:  enoxaparin, 40 mg, Subcutaneous, Daily  predniSONE, 40 mg, Oral, Daily      Continuous IV Infusions:     PRN Meds:  acetaminophen, 650 mg, Oral, Q6H PRN  albuterol, 2 puff, Inhalation, Q6H PRN  aluminum-magnesium hydroxide-simethicone, 30 mL, Oral, Q6H PRN  docusate sodium, 100 mg, Oral, BID PRN  ondansetron, 4 mg, Intravenous, Q6H PRN          Network Utilization Review Department  ATTENTION: Please call with any questions or concerns to [phone] and carefully listen to the prompts so that you are directed to the right person  All voicemails are confidential   Driss Elder all requests for admission clinical reviews, approved or denied determinations and any other requests to dedicated fax number below belonging to the campus where the patient is receiving treatment   List of dedicated fax numbers for the Facilities:  FACILITY NAME UR FAX NUMBER   ADMISSION DENIALS (Administrative/Medical Necessity) [phone]   [address] (Maternity/NICU/Pediatrics) 261 [facility],[address] Floor [facility] [address] [place] [address]  [phone]   Seun Campbell [address] ([facility]) [address] [address] [place] [address] [phone]   [address]51 [phone]

## 2021-03-15 NOTE — CASE MANAGEMENT
LOS 9 HOURS  PATIENT CLASS OBV  PATIENT IS NOT A READMISSION  PATIENT IS NOT A BUNDLE  CM REVIEWED CHART  CM met with patient at bedside to complete CM open and discuss discharge planning  Patient lives [facility] with his wife, dtr and grandchildren  He lives in a bilevel with 7-10 steps from the garage level to the main level  He has no DME and is independent for ADL and mobility  He has no HHC hx and no Rehab (IP or OP) hx, He uses [facility] for pharmacy  He sees Alesha TREADWELL for PCP  He does endorse regular smoking and wine use  His wife is his HCR  He is employed at an area [employer] and does drive  He states his family can provide transportation home  CM reviewed discharge planning process including the following: identifying caregivers at home, preference for d/c planning needs, availability of treatment team to discuss questions or concerns patient and/or family may have regarding diagnosis, plan of care, old or new medications and discharge planning   CM will continue to follow for care coordination and update assessment as appropriate      Patient is a 77 yo male admitted for orthopenia, nebulizer, prednisone, echo, NM Myocardial Perfusion spect test, PLOF: independent, DCP: DC home with OP followup, Transportation: family will provide

## 2021-03-15 NOTE — H&P
[address]  H&P- Maximus Lieberman 1955, 76 y o  male MRN: [id number]  Unit/Bed#: ED 15 Encounter: [id number]  Primary Care Provider: EBEN Mcmahon   Date and time admitted to hospital: 3/14/2021  9:10 PM    * Orthopnea  Assessment & Plan  · Patient reports constant orthopnea over the last 2 weeks  He reports it is worse with lying down and help wake up in the middle of the night and have severe shortness of breath and will have to sit upper walk around for it to subside  Denies any dyspnea on exertion reports usually walking around helps the shortness of breath go way  · Denies anxiety, cardiac history including congestive heart failure, CAD, COPD or asthma  · Was evaluated at the ED on 03/13 and was given p r n  Albuterol inhaler and prednisone patient reports he only took his 1st prednisone dose just prior to come to the ED tonight, but came back because orthopnea still persistent  · Chest x-ray negative on both 3/13 in 3/14  · Saturating well at 98% on room air  · EKG revealing borderline QTC, normal sinus rhythm, right bundle branch block, LVH, no ST elevation, no significant change compared to 03/13 EKG  · D-dimer negative, troponin x2 negative, BNP WNL at 3 1  · Patient does admit to being a tobacco smoker smoking about 10 cigarettes a week, rule out if related to new onset COPD versus cardiac issue? · Discussed with patient about following up outpatient with Cardiology  · For now will trend troponin 1 more time q 3 hours with EKG despite patient denying any chest pain  · P r n  Albuterol inhaler and nebulizer  · Finish prednisone 40 mg q d   Taper for 4 more days, incentive spirometry    Cigarette nicotine dependence without complication  Assessment & Plan  · Smokes 10 cigarettes per week  · Discuss benefit of tobacco cessation  · Provide with tobacco cessation Education materials upon discharge      VTE Prophylaxis: Enoxaparin (Lovenox)  / sequential compression device Code Status:  Level 1, full code  POLST: POLST form is not discussed and not completed at this time  Anticipated Length of Stay:  Patient will be admitted on an Observation basis with an anticipated length of stay of  less than 2 midnights  Justification for Hospital Stay:  See above    Total Time for Visit, including Counseling / Coordination of Care: 1 hour  Greater than 50% of this total time spent on direct patient counseling and coordination of care  Chief Complaint:      Chief Complaint   Patient presents with    Shortness of Breath     pt c/o icnreased sob this afternoon, pt states being here yesterday and took prescribed medications with no relief  pt denies any cardiac/resp issues       History of Present Illness:    Esther Parker is a 76 y o  male with  PMH not limited to tobacco use disorder who presents with complaint of constant orthopnea x2 weeks  Denies any gradual worsening  Was evaluated in ED on 03/13 and was given 5 day course of prednisone and inhaler  Patient reports he returned because there has been no change in his orthopnea, but he also reports he only took his 1st dose of the prednisone taper just prior to coming to the ED tonight  Does admit to tobacco use  Denies any history of COPD or asthma, any cardiac issues including heart failure  Reports shortness of breath is mostly at night when he lays down wakes up in the middle night  Reports what does help relieve it is walking around or sitting up  Denies any dyspnea on exertion  Denies any increase in anxiety  Denies chest pain, dyspnea, headaches or dizziness  Review of Systems:    Review of Systems   Constitutional: Negative for activity change and fatigue  Respiratory: Positive for shortness of breath  Negative for chest tightness  Cardiovascular: Negative for chest pain, palpitations and leg swelling  Gastrointestinal: Negative for abdominal pain, diarrhea and nausea     Neurological: Negative for dizziness and headaches  Psychiatric/Behavioral: The patient is not nervous/anxious  Past Medical and Surgical History:     History reviewed  No pertinent past medical history  History reviewed  No pertinent surgical history  Meds/Allergies:    Prior to Admission medications    Medication Sig Start Date End Date Taking? Authorizing Provider   albuterol (PROVENTIL HFA,VENTOLIN HFA) 90 mcg/act inhaler Inhale 2 puffs every 4 (four) hours as needed for wheezing 3/13/21 3/15/21  Rahul Chen MD   predniSONE 20 mg tablet Take 2 tablets (40 mg total) by mouth daily for 5 days 3/13/21 3/15/21  Rahul Chen MD     I have reviewed home medications with patient personally      Allergies: No Known Allergies    Social History:     Marital Status: /Civil Union     Patient Pre-hospital Level of Mobility: independent  Patient Pre-hospital Diet Restrictions: no  Substance Use History:   Social History     Substance and Sexual Activity   Alcohol Use Yes    Alcohol/week: 6 0 standard drinks    Types: 3 Cans of beer, 3 Glasses of wine per week    Frequency: 2-3 times a week    Drinks per session: 3 or 4    Comment: once a week     Social History     Tobacco Use   Smoking Status Current Every Day Smoker    Years: 50 00    Types: Cigarettes   Smokeless Tobacco Never Used   Tobacco Comment    2-3 cigarettes a day     Social History     Substance and Sexual Activity   Drug Use Never       Family History:    Family History   Problem Relation Age of Onset    No Known Problems Mother     No Known Problems Father     No Known Problems Brother     No Known Problems Maternal Grandmother     No Known Problems Maternal Grandfather     No Known Problems Paternal Grandmother     No Known Problems Paternal Grandfather        Physical Exam:     Vitals:   Blood Pressure: 144/80 (03/15/21 0046)  Pulse: 71 (03/15/21 0046)  Temperature: 98 1 °F (36 7 °C) (03/14/21 2115)  Temp Source: Oral (03/14/21 2115)  Respirations: 20 (03/15/21 0046)  SpO2: 98 % (03/15/21 0046)    Physical Exam  Vitals signs and nursing note reviewed  Constitutional:       General: He is not in acute distress  Appearance: Normal appearance  He is not ill-appearing  Cardiovascular:      Rate and Rhythm: Normal rate and regular rhythm  Pulses: Normal pulses  Heart sounds: Normal heart sounds  Pulmonary:      Effort: Pulmonary effort is normal  No respiratory distress  Breath sounds: Normal breath sounds  No stridor  No wheezing or rales  Abdominal:      General: Abdomen is flat  Bowel sounds are normal  There is no distension  Palpations: Abdomen is soft  Tenderness: There is no abdominal tenderness  There is no guarding  Musculoskeletal:         General: No tenderness  Right lower leg: No edema  Left lower leg: No edema  Skin:     General: Skin is warm and dry  Coloration: Skin is not pale  Findings: No erythema  Neurological:      General: No focal deficit present  Mental Status: He is alert and oriented to person, place, and time  Psychiatric:         Mood and Affect: Mood normal          Behavior: Behavior normal          Thought Content: Thought content normal          Judgment: Judgment normal            Additional Data:     Lab Results: I have personally reviewed pertinent reports        Results from last 7 days   Lab Units 03/14/21 2127   WBC Thousand/uL 5 18   HEMOGLOBIN g/dL 13 0   HEMATOCRIT % 40 2   PLATELETS Thousands/uL 274   NEUTROS PCT % 50   LYMPHS PCT % 37   MONOS PCT % 11   EOS PCT % 2     Results from last 7 days   Lab Units 03/14/21 2127   SODIUM mmol/L 142   POTASSIUM mmol/L 3 6   CHLORIDE mmol/L 106   CO2 mmol/L 24   BUN mg/dL 19   CREATININE mg/dL 1 24   ANION GAP mmol/L 12   CALCIUM mg/dL 8 4   ALBUMIN g/dL 3 5   TOTAL BILIRUBIN mg/dL 0 42   ALK PHOS U/L 78   ALT U/L 39   AST U/L 23   GLUCOSE RANDOM mg/dL 120                       Imaging: I have personally reviewed pertinent films in PACS    XR chest 1 view portable    (Results Pending)       EKG, Pathology, and Other Studies Reviewed on Admission:   · EKG: borderline QTc, NSR, RBBB, LVH, no ST elevation , no sig change from previous    Allscripts / Epic Records Reviewed: Yes     ** Please Note: This note has been constructed using a voice recognition system   **

## 2021-03-16 ENCOUNTER — APPOINTMENT (INPATIENT)
Dept: NUCLEAR MEDICINE | Facility: HOSPITAL | Age: 66
DRG: 204 | End: 2021-03-16
Payer: MEDICARE

## 2021-03-16 ENCOUNTER — APPOINTMENT (INPATIENT)
Dept: NON INVASIVE DIAGNOSTICS | Facility: HOSPITAL | Age: 66
DRG: 204 | End: 2021-03-16
Payer: MEDICARE

## 2021-03-16 VITALS
OXYGEN SATURATION: 99 % | TEMPERATURE: 98.7 F | HEIGHT: 70 IN | BODY MASS INDEX: 29.92 KG/M2 | WEIGHT: 209 LBS | RESPIRATION RATE: 18 BRPM | SYSTOLIC BLOOD PRESSURE: 162 MMHG | HEART RATE: 68 BPM | DIASTOLIC BLOOD PRESSURE: 80 MMHG

## 2021-03-16 LAB
ARRHY DURING EX: NORMAL
CHEST PAIN STATEMENT: NORMAL
MAX DIASTOLIC BP: 94 MMHG
MAX HEART RATE: 133 BPM
MAX PREDICTED HEART RATE: 154 BPM
MAX. SYSTOLIC BP: 210 MMHG
PROTOCOL NAME: NORMAL
TARGET HR FORMULA: NORMAL
TEST INDICATION: NORMAL
TIME IN EXERCISE PHASE: NORMAL

## 2021-03-16 PROCEDURE — 93018 CV STRESS TEST I&R ONLY: CPT | Performed by: INTERNAL MEDICINE

## 2021-03-16 PROCEDURE — 93016 CV STRESS TEST SUPVJ ONLY: CPT | Performed by: INTERNAL MEDICINE

## 2021-03-16 PROCEDURE — 78452 HT MUSCLE IMAGE SPECT MULT: CPT

## 2021-03-16 PROCEDURE — 78452 HT MUSCLE IMAGE SPECT MULT: CPT | Performed by: INTERNAL MEDICINE

## 2021-03-16 PROCEDURE — 93017 CV STRESS TEST TRACING ONLY: CPT

## 2021-03-16 PROCEDURE — A9502 TC99M TETROFOSMIN: HCPCS

## 2021-03-16 PROCEDURE — G1004 CDSM NDSC: HCPCS

## 2021-03-16 PROCEDURE — 99239 HOSP IP/OBS DSCHRG MGMT >30: CPT | Performed by: INTERNAL MEDICINE

## 2021-03-16 RX ORDER — HYDROXYZINE HYDROCHLORIDE 25 MG/1
25 TABLET, FILM COATED ORAL 2 TIMES DAILY PRN
Qty: 20 TABLET | Refills: 0 | Status: SHIPPED | OUTPATIENT
Start: 2021-03-16 | End: 2021-03-24 | Stop reason: SDUPTHER

## 2021-03-16 RX ADMIN — PREDNISONE 40 MG: 20 TABLET ORAL at 17:50

## 2021-03-16 RX ADMIN — ENOXAPARIN SODIUM 40 MG: 40 INJECTION SUBCUTANEOUS at 08:38

## 2021-03-16 NOTE — PLAN OF CARE
Problem: PAIN - ADULT  Goal: Verbalizes/displays adequate comfort level or baseline comfort level  Description: Interventions:  - Encourage patient to monitor pain and request assistance  - Assess pain using appropriate pain scale  - Administer analgesics based on type and severity of pain and evaluate response  - Implement non-pharmacological measures as appropriate and evaluate response  - Consider cultural and social influences on pain and pain management  - Notify physician/advanced practitioner if interventions unsuccessful or patient reports new pain  Outcome: Progressing     Problem: SAFETY ADULT  Goal: Patient will remain free of falls  Description: INTERVENTIONS:  - Assess patient frequently for physical needs  -  Identify cognitive and physical deficits and behaviors that affect risk of falls    -  [place] fall precautions as indicated by assessment   - Educate patient/family on patient safety including physical limitations  - Instruct patient to call for assistance with activity based on assessment  - Modify environment to reduce risk of injury  - Consider OT/PT consult to assist with strengthening/mobility  Outcome: Progressing  Goal: Maintain or return to baseline ADL function  Description: INTERVENTIONS:  -  Assess patient's ability to carry out ADLs; assess patient's baseline for ADL function and identify physical deficits which impact ability to perform ADLs (bathing, care of mouth/teeth, toileting, grooming, dressing, etc )  - Assess/evaluate cause of self-care deficits   - Assess range of motion  - Assess patient's mobility; develop plan if impaired  - Assess patient's need for assistive devices and provide as appropriate  - Encourage maximum independence but intervene and supervise when necessary  - Involve family in performance of ADLs  - Assess for home care needs following discharge   - Consider OT consult to assist with ADL evaluation and planning for discharge  - Provide patient education as appropriate  Outcome: Progressing  Goal: Maintain or return mobility status to optimal level  Description: INTERVENTIONS:  - Assess patient's baseline mobility status (ambulation, transfers, stairs, etc )    - Identify cognitive and physical deficits and behaviors that affect mobility  - Identify mobility aids required to assist with transfers and/or ambulation (gait belt, sit-to-stand, lift, walker, cane, etc )  - [place] fall precautions as indicated by assessment  - Record patient progress and toleration of activity level on Mobility SBAR; progress patient to next Phase/Stage  - Instruct patient to call for assistance with activity based on assessment  - Consider rehabilitation consult to assist with strengthening/weightbearing, etc   Outcome: Progressing  Goal: Complete daily ADLs, including personal hygiene independently, as able  Outcome: Progressing     Problem: DISCHARGE PLANNING  Goal: Discharge to home or other facility with appropriate resources  Description: INTERVENTIONS:  - Identify barriers to discharge w/patient and caregiver  - Arrange for needed discharge resources and transportation as appropriate  - Identify discharge learning needs (meds, wound care, etc )  - Arrange for interpretive services to assist at discharge as needed  - Refer to Case Management Department for coordinating discharge planning if the patient needs post-hospital services based on physician/advanced practitioner order or complex needs related to functional status, cognitive ability, or social support system  Outcome: Progressing  Goal: Complete daily ADLs, including personal hygiene independently, as able  Outcome: Progressing     Problem: Knowledge Deficit  Goal: Patient/family/caregiver demonstrates understanding of disease process, treatment plan, medications, and discharge instructions  Description: Complete learning assessment and assess knowledge base    Interventions:  - Provide teaching at level of understanding  - Provide teaching via preferred learning methods  Outcome: Progressing  Goal: Ability to express needs and understand communication  Outcome: Progressing

## 2021-03-16 NOTE — DISCHARGE SUMMARY
[facility]  Discharge- Elsa Ayala 1955, 76 y o  male MRN: [id number]  Unit/Bed#: -01 Encounter: [id number]  Primary Care Provider: EBEN Perez   Date and time admitted to hospital: 3/14/2021  9:10 PM    * Orthopnea  Assessment & Plan  · Patient reports constant orthopnea over the last 2 weeks  He reports it is worse with lying down and help wake up in the middle of the night and have severe shortness of breath and will have to sit upper walk around for it to subside  EKG revealing borderline QTC, normal sinus rhythm, right bundle branch block, LVH, no ST elevation, no significant change compared to 03/13 EKG  · Had nuclear stress test which was negative for ischemia  Showed moderate sized inferior defect likely diaphragmatic attenuation  Fraction 54%  · Echocardiogram ejection fraction 60% no regional wall motion abnormalities    He appears more like a panic attack that comes in the middle of the night  Discussed with patient about using hydroxyzine as needed for anxiety   outpatient PCP/psychiatric follow-up recommended  Cigarette nicotine dependence without complication  Assessment & Plan  · Counseled patient on smoking cessation      Discharging Physician / Practitioner: Faith Caballero MD  PCP: Gray Perez  Admission Date:   Admission Orders (From admission, onward)     Ordered        03/15/21 1100  Inpatient Admission  Once         03/14/21 2229  Place in Observation  Once                   Discharge Date: 03/16/21    Resolved Problems  Date Reviewed: 3/16/2021    None          Consultations During Hospital Stay:  · None    Procedures Performed:   · Pharmacological stress test  IMPRESSIONS: Moderate sized, moderate intenisty inferior defect likely c/w daiphrgmatic attenuation  No ischemia  Clears with prone imaging   Left ventricular systolic function was normal   Significant Findings / Test Results:   · Chest x-ray no acute cardiopulmonary disease    Incidental Findings:   ·      Test Results Pending at Discharge (will require follow up):   ·      Outpatient Tests Requested:  ·     Complications:      Reason for Admission:     Hospital Course:     Wali Martínez is a 76 y o  male patient with past medical history of nicotine dependence who originally presented to the hospital on 3/14/2021 due to symptoms of orthopnea shortness of breath worse when he is sleeping  EKG did not demonstrate any evidence of acute ischemia  Labs were within normal limits  He underwent pharmacological stress test which did not demonstrate any evidence of ischemia  Patient's symptoms are likely believed secondary to panic attack/anxiety, recommended outpatient follow-up with PCP/psychiatric for further management  Recommended to take hydroxyzine as needed for anxiety  Counseled on smoking cessation  Please see above list of diagnoses and related plan for additional information  Condition at Discharge: stable     Discharge Day Visit / Exam:     Subjective:    Vitals: Blood Pressure: 162/80 (03/16/21 1503)  Pulse: 55 (03/16/21 1111)  Temperature: 98 7 °F (37 1 °C) (03/16/21 1503)  Temp Source: Oral (03/16/21 1111)  Respirations: 18 (03/16/21 1503)  Height: 5' 10" (177 8 cm) (03/15/21 0046)  Weight - Scale: 94 8 kg (208 lb 15 9 oz) (03/15/21 0046)  SpO2: 100 % (03/16/21 1111)  Exam:   Physical Exam  Vitals signs and nursing note reviewed  Constitutional:       Appearance: Normal appearance  Neck:      Musculoskeletal: Normal range of motion  Cardiovascular:      Rate and Rhythm: Normal rate and regular rhythm  Pulmonary:      Effort: Pulmonary effort is normal       Breath sounds: Normal breath sounds  Abdominal:      General: Abdomen is flat  Palpations: Abdomen is soft  Musculoskeletal: Normal range of motion  Skin:     General: Skin is warm  Neurological:      General: No focal deficit present        Mental Status: He is alert and oriented to person, place, and time  Discussion with Family:    Discharge instructions/Information to patient and family:   See after visit summary for information provided to patient and family  Provisions for Follow-Up Care:  See after visit summary for information related to follow-up care and any pertinent home health orders  Disposition:     Home  Planned Readmission:      Discharge Statement:  I spent 25 minutes discharging the patient  This time was spent on the day of discharge  I had direct contact with the patient on the day of discharge  Greater than 50% of the total time was spent examining patient, answering all patient questions, arranging and discussing plan of care with patient as well as directly providing post-discharge instructions  Additional time then spent on discharge activities  Discharge Medications:  See after visit summary for reconciled discharge medications provided to patient and family        ** Please Note: This note has been constructed using a voice recognition system **

## 2021-03-16 NOTE — ASSESSMENT & PLAN NOTE
· Patient reports constant orthopnea over the last 2 weeks  He reports it is worse with lying down and help wake up in the middle of the night and have severe shortness of breath and will have to sit upper walk around for it to subside  EKG revealing borderline QTC, normal sinus rhythm, right bundle branch block, LVH, no ST elevation, no significant change compared to 03/13 EKG  · Had nuclear stress test which was negative for ischemia  Showed moderate sized inferior defect likely diaphragmatic attenuation  Fraction 54%  · Echocardiogram ejection fraction 60% no regional wall motion abnormalities    He appears more like a panic attack that comes in the middle of the night  Discussed with patient about using hydroxyzine as needed for anxiety   outpatient PCP/psychiatric follow-up recommended

## 2021-03-17 ENCOUNTER — TRANSITIONAL CARE MANAGEMENT (OUTPATIENT)
Dept: INTERNAL MEDICINE CLINIC | Facility: CLINIC | Age: 66
End: 2021-03-17

## 2021-03-17 NOTE — PROGRESS NOTES
1st attempt-LVM asking pt to return call for TCM documentation and TCM appointment        By Christopher Gardner

## 2021-03-18 NOTE — UTILIZATION REVIEW
Initial Clinical Review  OBSERVATION 3/14 @ 2229 CONVERTED TO INPATIENT 3/15 @ 1100 DUE TO DYSPNEA   Admission: Date/Time/Statement:   Admission Orders (From admission, onward)     Ordered        03/15/21 1100  Inpatient Admission  Once                   Orders Placed This Encounter   Procedures    Inpatient Admission     Standing Status:   Standing     Number of Occurrences:   1     Order Specific Question:   Level of Care     Answer:   Med Surg [16]     Order Specific Question:   Estimated length of stay     Answer:   More than 2 Midnights     Order Specific Question:   Certification     Answer:   I certify that inpatient services are medically necessary for this patient for a duration of greater than two midnights  See H&P and MD Progress Notes for additional information about the patient's course of treatment  Comments:   orthopnea     ED Arrival Information     Expected Arrival Acuity Means of Arrival Escorted By Service Admission Type    - 3/14/2021 21:09 Urgent Walk-In Family Member  (daughter) General Medicine Urgent    Arrival Complaint    sob        Chief Complaint   Patient presents with    Shortness of Breath     pt c/o icnreased sob this afternoon, pt states being here yesterday and took prescribed medications with no relief  pt denies any cardiac/resp issues     Assessment/Plan:  76year old male to the ED from home with complaints of shortness of breath which started a day prior to arrival to ED  Admitted initially under observation then converted to inpatient for orthopnea, cigarette dependence  Was seen in ED 3/13, given 5 day course prednisone and inhaler  He returns after taking 1 dose of prednisone and having continued difficulty breathing at night and when laying down  CXR shows no acute abnormality  Initial troponin neg  COntinue to trend  D-dimer negative  Denies COPD     ED Triage Vitals   Temperature Pulse Respirations Blood Pressure SpO2   03/14/21 2115 03/14/21 2114 03/14/21 2114 03/14/21 2114 03/14/21 2113   98 1 °F (36 7 °C) 70 22 159/72 98 %      Temp Source Heart Rate Source Patient Position - Orthostatic VS BP Location FiO2 (%)   03/14/21 2115 03/14/21 2114 03/14/21 2114 03/14/21 2114 --   Oral Monitor Sitting Right arm       Pain Score       03/15/21 0046       No Pain          Wt Readings from Last 1 Encounters:   03/15/21 94 8 kg (208 lb 15 9 oz)     Additional Vital Signs:   Date/Time  Temp Pulse  Resp  BP  MAP (mmHg)  SpO2  O2 Device Patient Position - Orthostatic VS   03/16/21 15:03:32  98 7 °F (37 1 °C) 68  18  162/80  107  99 %  None (Room air) Lying   03/16/21 11:11:31  97 8 °F (36 6 °C) 55  18  160/82  108  100 %  None (Room air) Lying   03/16/21 07:52:20  97 7 °F (36 5 °C) 64  18  157/80  106  98 %  None (Room air) Lying   03/16/21 0234  97 7 °F (36 5 °C) 67  16  165/71  102  97 %  None (Room air) Lying   03/16/21 0009  -- 73  18  167/77  110  100 %  None (Room air) Lying   03/16/21 00:06:55  98 4 °F (36 9 °C) --  --  150/117Abnormal   128  --  -- --   03/15/21 20:47:27  98 °F (36 7 °C) --  --  164/81  109  98 %  None (Room air) --   03/15/21 20:46:39  -- --  --  162/79  107  --  -- --   03/15/21 1900  98 °F (36 7 °C) 65  18  162/74  102  98 %  None (Room air) Lying   03/15/21 0400  -- 59  20  122/56  80  97 %  None (Room air) Sitting   03/15/21 0046  -- 71  20  144/80  --  98 %  None (Room air) Sitting   03/14/21 2115  98 1 °F (36 7 °C) --  --  --  --  --  -- --   03/14/21 2114  -- 70  22  159/72  --  99 %  None (Room air)      Pertinent Labs/Diagnostic Test Results:   · 3/16 Stress test: Pharmacological stress test  IMPRESSIONS: Moderate sized, moderate intenisty inferior defect likely c/w daiphrgmatic attenuation  No ischemia  Clears with prone imaging  Left ventricular systolic function was normal   3/15 CXR: No acute cardiopulmonary disease  3/14 CXR: No acute cardiopulmonary disease     3/15 EKG: Normal sinus rhythm  Left axis deviation  Right bundle branch block  Abnormal ECG    3/15 ECHO: LEFT VENTRICLE: Size was normal  Ejection fraction was estimated to be 60 %  There were no regional wall motion abnormalities  DOPPLER: Left ventricular diastolic function parameters were normal      RIGHT VENTRICLE: The size was normal  Systolic function was normal  Wall thickness was normal      LEFT ATRIUM: Size was normal      RIGHT ATRIUM: Size was normal      MITRAL VALVE: Valve structure was normal  There was normal leaflet separation  DOPPLER: The transmitral velocity was within the normal range  There was no evidence for stenosis  There was no regurgitation      AORTIC VALVE: The valve was trileaflet  Leaflets exhibited normal thickness and normal cuspal separation  DOPPLER: Transaortic velocity was within the normal range  There was no evidence for stenosis  There was no regurgitation      TRICUSPID VALVE: The valve structure was normal  There was normal leaflet separation  DOPPLER: The transtricuspid velocity was within the normal range  There was no evidence for stenosis  There was no regurgitation      PULMONIC VALVE: Leaflets exhibited normal thickness, no calcification, and normal cuspal separation  DOPPLER: The transpulmonic velocity was within the normal range  There was no regurgitation      PERICARDIUM: There was no pericardial effusion  The pericardium was normal in appearance        Results from last 7 days   Lab Units 03/14/21 2127 03/13/21 2230   WBC Thousand/uL 5 18 5 05   HEMOGLOBIN g/dL 13 0 14 2   HEMATOCRIT % 40 2 44 0   PLATELETS Thousands/uL 274 295   NEUTROS ABS Thousands/µL 2 55 2 37         Results from last 7 days   Lab Units 03/14/21 2127 03/13/21 2230   SODIUM mmol/L 142 143   POTASSIUM mmol/L 3 6 4 3   CHLORIDE mmol/L 106 105   CO2 mmol/L 24 29   ANION GAP mmol/L 12 9   BUN mg/dL 19 11   CREATININE mg/dL 1 24 1 02   EGFR ml/min/1 73sq m 70 88   CALCIUM mg/dL 8 4 9 1     Results from last 7 days   Lab Units 03/14/21 2127 03/13/21  2230   AST U/L 23 28   ALT U/L 39 41   ALK PHOS U/L 78 84   TOTAL PROTEIN g/dL 7 2 8 5*   ALBUMIN g/dL 3 5 4 1   TOTAL BILIRUBIN mg/dL 0 42 0 51         Results from last 7 days   Lab Units 03/14/21 2127 03/13/21  2230   GLUCOSE RANDOM mg/dL 120 91         Results from last 7 days   Lab Units 03/15/21  0441 03/15/21  0049 03/14/21 2127 03/13/21  2230   TROPONIN I ng/mL <0 02 <0 02 <0 02 <0 02     Results from last 7 days   Lab Units 03/14/21 2127   D-DIMER QUANTITATIVE ug/ml FEU 0 36       Results from last 7 days   Lab Units 03/13/21  2230   NT-PRO BNP pg/mL 31       ED Treatment:   Medication Administration from 03/14/2021 2108 to 03/15/2021 1344       Date/Time Order Dose Route Action     03/14/2021 2137 albuterol inhalation solution 5 mg 5 mg Nebulization Given     03/14/2021 2137 ipratropium (ATROVENT) 0 02 % inhalation solution 0 5 mg 0 5 mg Nebulization Given     03/14/2021 2139 aspirin chewable tablet 324 mg 324 mg Oral Given     03/15/2021 0956 enoxaparin (LOVENOX) subcutaneous injection 40 mg 40 mg Subcutaneous Given        History reviewed  No pertinent past medical history  Present on Admission:   Orthopnea   Cigarette nicotine dependence without complication      Admitting Diagnosis: Chest pain [R07 9]  SOB (shortness of breath) [R06 02]  EKG abnormalities [R94 31]  Age/Sex: 76 y o  male  Admission Orders:  Scheduled Medications:  lovenox SQ, atrovent, prednisone po, tylenol po, albuterol inhaled, aspirin chewable  Network Utilization Review Department  ATTENTION: Please call with any questions or concerns to [phone] and carefully listen to the prompts so that you are directed to the right person  All voicemails are confidential   Leidy Martinez all requests for admission clinical reviews, approved or denied determinations and any other requests to dedicated fax number below belonging to the campus where the patient is receiving treatment   List of dedicated fax numbers for the Facilities:  [address] NUMBER   ADMISSION DENIALS (Administrative/Medical Necessity) [phone]   [address] (Maternity/NICU/Pediatrics) [address] [facility],[address] Floor [facility] [address] [facility]  [phone]   [address] (  [address]) [address] [place] [address] [phone]   [place] [address] [phone]

## 2021-03-21 NOTE — ED PROVIDER NOTES
History  Chief Complaint   Patient presents with    Breathing Problem     Pt states that he wakes up in the middle of the night with the feel;ing of not being able to breathe  He admits that he feels as if it could be anxiety related  VSS  SPo2 98% on RA  42-year-old male presenting to the emergency department for evaluation of shortness of breath  Patient states that he woke up feeling short of breath middle the night, states he feels somewhat anxious, patient describes orthopnea progressive over the past week or so  Patient has a significant smoking history but no known cardiac denies any cough fever or chills  Denies any lightheadedness or palpitations  None       History reviewed  No pertinent past medical history  History reviewed  No pertinent surgical history  Family History   Problem Relation Age of Onset    No Known Problems Mother     No Known Problems Father     No Known Problems Brother     No Known Problems Maternal Grandmother     No Known Problems Maternal Grandfather     No Known Problems Paternal Grandmother     No Known Problems Paternal Grandfather      I have reviewed and agree with the history as documented  E-Cigarette/Vaping    E-Cigarette Use Never User      E-Cigarette/Vaping Substances    Nicotine No     THC No     CBD No     Flavoring No     Other No     Unknown No      Social History     Tobacco Use    Smoking status: Current Every Day Smoker     Years: 50 00     Types: Cigarettes    Smokeless tobacco: Never Used    Tobacco comment: 2-3 cigarettes a day   Substance Use Topics    Alcohol use: Yes     Alcohol/week: 6 0 standard drinks     Types: 3 Cans of beer, 3 Glasses of wine per week     Frequency: 2-3 times a week     Drinks per session: 3 or 4     Comment: once a week    Drug use: Never       Review of Systems   Constitutional: Negative for appetite change, chills, fatigue and fever  HENT: Negative for sneezing and sore throat      Eyes: Negative for visual disturbance  Respiratory: Positive for shortness of breath  Negative for cough, choking, chest tightness and wheezing  Cardiovascular: Negative for chest pain and palpitations  Gastrointestinal: Negative for abdominal pain, constipation, diarrhea, nausea and vomiting  Genitourinary: Negative for difficulty urinating and dysuria  Neurological: Negative for dizziness, weakness, light-headedness, numbness and headaches  All other systems reviewed and are negative  Physical Exam  Physical Exam  Vitals signs and nursing note reviewed  Constitutional:       General: He is not in acute distress  Appearance: He is well-developed  He is not diaphoretic  HENT:      Head: Normocephalic and atraumatic  Eyes:      Pupils: Pupils are equal, round, and reactive to light  Neck:      Vascular: No JVD  Trachea: No tracheal deviation  Cardiovascular:      Rate and Rhythm: Normal rate and regular rhythm  Heart sounds: Normal heart sounds  No murmur  No friction rub  No gallop  Pulmonary:      Effort: Pulmonary effort is normal  No respiratory distress  Breath sounds: Normal breath sounds  No wheezing or rales  Abdominal:      General: Bowel sounds are normal  There is no distension  Palpations: Abdomen is soft  Tenderness: There is no abdominal tenderness  There is no guarding or rebound  Skin:     General: Skin is warm and dry  Coloration: Skin is not pale  Neurological:      Mental Status: He is alert and oriented to person, place, and time  Cranial Nerves: No cranial nerve deficit  Motor: No abnormal muscle tone     Psychiatric:         Behavior: Behavior normal          Vital Signs  ED Triage Vitals [03/13/21 2149]   Temperature Pulse Respirations Blood Pressure SpO2   97 8 °F (36 6 °C) 75 18 (!) 178/92 98 %      Temp Source Heart Rate Source Patient Position - Orthostatic VS BP Location FiO2 (%)   Oral Monitor Lying Right arm -- Pain Score       --           Vitals:    03/13/21 2149 03/13/21 2300 03/14/21 0000   BP: (!) 178/92 164/86 156/76   Pulse: 75 58 61   Patient Position - Orthostatic VS: Lying Lying Lying         Visual Acuity      ED Medications  Medications - No data to display    Diagnostic Studies  Results Reviewed     Procedure Component Value Units Date/Time    NT-BNP PRO [[id number]]  (Normal) Collected: 03/13/21 2230    Lab Status: Final result Specimen: Blood from Arm, Right Updated: 03/13/21 2322     NT-proBNP 31 pg/mL     Comprehensive metabolic panel [[id number]]  (Abnormal) Collected: 03/13/21 2230    Lab Status: Final result Specimen: Blood from Arm, Right Updated: 03/13/21 2315     Sodium 143 mmol/L      Potassium 4 3 mmol/L      Chloride 105 mmol/L      CO2 29 mmol/L      ANION GAP 9 mmol/L      BUN 11 mg/dL      Creatinine 1 02 mg/dL      Glucose 91 mg/dL      Calcium 9 1 mg/dL      AST 28 U/L      ALT 41 U/L      Alkaline Phosphatase 84 U/L      Total Protein 8 5 g/dL      Albumin 4 1 g/dL      Total Bilirubin 0 51 mg/dL      eGFR 88 ml/min/1 73sq m     Narrative:      [place] guidelines for Chronic Kidney Disease (CKD):     Stage 1 with normal or high GFR (GFR > 90 mL/min/1 73 square meters)    Stage 2 Mild CKD (GFR = 60-89 mL/min/1 73 square meters)    Stage 3A Moderate CKD (GFR = 45-59 mL/min/1 73 square meters)    Stage 3B Moderate CKD (GFR = 30-44 mL/min/1 73 square meters)    Stage 4 Severe CKD (GFR = 15-29 mL/min/1 73 square meters)    Stage 5 End Stage CKD (GFR <15 mL/min/1 73 square meters)  Note: GFR calculation is accurate only with a steady state creatinine    Troponin I [[id number]]  (Normal) Collected: 03/13/21 2230    Lab Status: Final result Specimen: Blood from Arm, Right Updated: 03/13/21 2315     Troponin I <0 02 ng/mL     CBC and differential [[id number]]  (Abnormal) Collected: 03/13/21 2230    Lab Status: Final result Specimen: Blood from Arm, Right Updated: 03/13/21 2243     WBC 5 05 Thousand/uL      RBC 5 06 Million/uL      Hemoglobin 14 2 g/dL      Hematocrit 44 0 %      MCV 87 fL      MCH 28 1 pg      MCHC 32 3 g/dL      RDW 15 3 %      MPV 10 4 fL      Platelets 254 Thousands/uL      nRBC 0 /100 WBCs      Neutrophils Relative 47 %      Immat GRANS % 0 %      Lymphocytes Relative 40 %      Monocytes Relative 8 %      Eosinophils Relative 4 %      Basophils Relative 1 %      Neutrophils Absolute 2 37 Thousands/µL      Immature Grans Absolute 0 01 Thousand/uL      Lymphocytes Absolute 2 04 Thousands/µL      Monocytes Absolute 0 41 Thousand/µL      Eosinophils Absolute 0 18 Thousand/µL      Basophils Absolute 0 04 Thousands/µL                  XR chest 2 views   Final Result by Duane Castillo MD (03/14 1304)   No acute cardiopulmonary disease  Workstation performed: [id number]                 Procedures  Procedures         ED Course               Identification of Seniors at Risk      Most Recent Value   (ISAR) Identification of Seniors at Risk   Before the illness or injury that brought you to the Emergency, did you need someone to help you on a regular basis? 0 Filed at: 03/13/2021 2151   In the last 24 hours, have you needed more help than usual?  0 Filed at: 03/13/2021 2151   Have you been hospitalized for one or more nights during the past 6 months? 0 Filed at: 03/13/2021 2151   In general, do you see well?  0 Filed at: 03/13/2021 2151   In general, do you have serious problems with your memory? 0 Filed at: 03/13/2021 2151   Do you take more than three different medications every day?  0 Filed at: 03/13/2021 2151   ISAR Score  0 Filed at: 03/13/2021 2151                    SBIRT 22yo+      Most Recent Value   SBIRT (23 yo +)   In order to provide better care to our patients, we are screening all of our patients for alcohol and drug use  Would it be okay to ask you these screening questions?   Yes Filed at: 03/13/2021 2152   Initial Alcohol Screen: US AUDIT-C 1  How often do you have a drink containing alcohol?  0 Filed at: 03/13/2021 2152   2  How many drinks containing alcohol do you have on a typical day you are drinking? 0 Filed at: 03/13/2021 2152   3a  Male UNDER 65: How often do you have five or more drinks on one occasion? 0 Filed at: 03/13/2021 2152   3b  FEMALE Any Age, or MALE 65+: How often do you have 4 or more drinks on one occassion? 0 Filed at: 03/13/2021 2152   Audit-C Score  0 Filed at: 03/13/2021 2152   GRISEL: How many times in the past year have you    Used an illegal drug or used a prescription medication for non-medical reasons? Never Filed at: 03/13/2021 2152                    MDM  Number of Diagnoses or Management Options  Dyspnea:   Diagnosis management comments: 74-year-old male presenting to emergency department for evaluation of orthopnea and shortness of breath, will check cardiac workup to assess for possible CHF, also tried nebs and steroids  Cardiac workup is unremarkable, will try nebs and steroids, recommended follow-up with PCP  Disposition  Final diagnoses:   Dyspnea     Time reflects when diagnosis was documented in both MDM as applicable and the Disposition within this note     Time User Action Codes Description Comment    3/13/2021 11:56 PM Marissa Salinas Add [R06 00] Dyspnea       ED Disposition     ED Disposition Condition Date/Time Comment    Discharge Stable Sat Mar 13, 2021 11:56 PM Jeannine Lax discharge to home/self care              Follow-up Information     Follow up With Specialties Details Why [facility] [place] [facility]   [facility] [id number]  [phone]            Discharge Medication List as of 3/14/2021 12:00 AM      START taking these medications    Details   albuterol (PROVENTIL HFA,VENTOLIN HFA) 90 mcg/act inhaler Inhale 2 puffs every 4 (four) hours as needed for wheezing, Starting Sat 3/13/2021, Normal         CONTINUE these medications which have CHANGED    Details   predniSONE 20 mg tablet Take 2 tablets (40 mg total) by mouth daily for 5 days, Starting Sat 3/13/2021, Until Thu 3/18/2021, Normal           No discharge procedures on file      PDMP Review     None          ED Provider  Electronically Signed by           Valentine Kimball MD  03/21/21 7942       Valentine Kimball MD  03/22/21 [id number]

## 2021-03-22 NOTE — PHYSICIAN ADVISOR
Current patient class: Inpatient  The patient is currently on Hospital Day: 3 at [address]      The patient was admitted to the hospital at 1100 on 3/15/21 for the following diagnosis:  Chest pain [R07 9]  SOB (shortness of breath) [R06 02]  EKG abnormalities [R94 31]       There is documentation in the medical record of an expected length of stay of at least 2 midnights  The patient is therefore expected to satisfy the 2 midnight benchmark and given the 2 midnight presumption is appropriate for INPATIENT ADMISSION  Given this expectation of a satisfying stay, CMS instructs us that the patient is most often appropriate for inpatient admission under part A provided medical necessity is documented in the chart  After review of the relevant documentation, labs, vital signs and test results, the patient is appropriate for INPATIENT ADMISSION  Admission to the hospital as an inpatient is a complex decision making process which requires the practitioner to consider the patients presenting complaint, history and physical examination and all relevant testing  With this in mind, in this case, the patient was deemed appropriate for INPATIENT ADMISSION  After review of the documentation and testing available at the time of the admission I concur with this clinical determination of medical necessity  Rationale is as follows: The patient is a 76 yrs old Male who presented to the ED at 3/14/2021  9:10 PM with a chief complaint of Shortness of Breath (pt c/o icnreased sob this afternoon, pt states being here yesterday and took prescribed medications with no relief  pt denies any cardiac/resp issues)   Patient came in with orthopnea and nocturnal dyspnea over 2 weeks getting progressively worse  Given the patient's symptomatology the patient was monitored with an echocardiogram being ordered to evaluate ejection fraction    Given the above the patient did cross the 2 midnight benchmark as set by Medicare is inpatient status appropriate  The patients vitals on arrival were   ED Triage Vitals   Temperature Pulse Respirations Blood Pressure SpO2   03/14/21 2115 03/14/21 2114 03/14/21 2114 03/14/21 2114 03/14/21 2113   98 1 °F (36 7 °C) 70 22 159/72 98 %      Temp Source Heart Rate Source Patient Position - Orthostatic VS BP Location FiO2 (%)   03/14/21 2115 03/14/21 2114 03/14/21 2114 03/14/21 2114 --   Oral Monitor Sitting Right arm       Pain Score       03/15/21 0046       No Pain           History reviewed  No pertinent past medical history  History reviewed  No pertinent surgical history  Consults have been placed to:   None    Vitals:    03/16/21 0234 03/16/21 0752 03/16/21 1111 03/16/21 1503   BP: 165/71 157/80 160/82 162/80   BP Location: Left arm   Left arm   Pulse: 67 64 55 68   Resp: 16 18 18 18   Temp: 97 7 °F (36 5 °C) 97 7 °F (36 5 °C) 97 8 °F (36 6 °C) 98 7 °F (37 1 °C)   TempSrc: Oral Oral Oral Oral   SpO2: 97% 98% 100% 99%   Weight:       Height:           Most recent labs:    No results for input(s): WBC, HGB, HCT, PLT, K, NA, CALCIUM, BUN, CREATININE, LIPASE, AMYLASE, INR, TROPONINI, CKTOTAL, AST, ALT, ALKPHOS, BILITOT in the last 72 hours  Scheduled Meds:  Continuous Infusions:No current facility-administered medications for this encounter  PRN Meds:      Surgical procedures (if appropriate):

## 2021-03-23 NOTE — ED PROVIDER NOTES
Expand All Collapse All                   History           Chief Complaint       Patient presents with            Breathing Problem                   Pt states that he wakes up in the middle of the night with the feel;ing of not being able to breathe  He admits that he feels as if it could be anxiety related  VSS  SPo2 98% on RA  75 yo m presenting to the emergency department for eval of sob  Pt was seen and evaluated by myself yesterday for the same, comes back with worsening sx  Previously had had burroughs/some orthopnea at night, trialed sgteroids and inhalers, steroids he said did not work, has not yet tried inhalers, does have extensive smoking hx, though states now that he has sob at rest during hte day  Still denies chest pain palpitations,                           None                   Medical History                   Surgical History                         Family History       Problem     Relation     Age of Onset            No Known Problems     Mother                  No Known Problems     Father                  No Known Problems     Brother                  No Known Problems     Maternal Grandmother                  No Known Problems     Maternal Grandfather                  No Known Problems     Paternal Grandmother                  No Known Problems     Paternal Grandfather                  I have reviewed and agree with the history as documented                   E-Cigarette/Vaping            E-Cigarette Use     Never User                          E-Cigarette/Vaping Substances            Nicotine     No                  THC     No                  CBD     No                  Flavoring     No                  Other     No                  Unknown     No                    Social History                    Tobacco Use            Smoking status:     Current Every Day Smoker                   Years:     50 00                   Types:     Cigarettes            Smokeless tobacco:     Never Used            Tobacco comment: 2-3 cigarettes a day       Substance Use Topics            Alcohol use: Yes                   Alcohol/week:     6 0 standard drinks                   Types:     3 Cans of beer, 3 Glasses of wine per week                   Frequency:     2-3 times a week                   Drinks per session:     3 or 4                   Comment: once a week            Drug use:     Never                 Review of Systems     Constitutional: Negative for appetite change, chills, fatigue and fever  HENT: Negative for sneezing and sore throat  Eyes: Negative for visual disturbance  Respiratory: Positive for shortness of breath  Negative for cough, choking, chest tightness and wheezing  Cardiovascular: Negative for chest pain and palpitations  Gastrointestinal: Negative for abdominal pain, constipation, diarrhea, nausea and vomiting  Genitourinary: Negative for difficulty urinating and dysuria  Neurological: Negative for dizziness, weakness, light-headedness, numbness and headaches  All other systems reviewed and are negative  Physical Exam    Physical Exam    Vitals signs and nursing note reviewed  Constitutional:         General: He is not in acute distress  Appearance: He is well-developed  He is not diaphoretic  HENT:        Head: Normocephalic and atraumatic  Eyes:        Pupils: Pupils are equal, round, and reactive to light  Neck:        Vascular: No JVD  Trachea: No tracheal deviation  Cardiovascular:        Rate and Rhythm: Normal rate and regular rhythm  Heart sounds: Normal heart sounds  No murmur  No friction rub  No gallop  Pulmonary:        Effort: Pulmonary effort is normal  No respiratory distress  Breath sounds: Normal breath sounds  No wheezing or rales  Abdominal:      General: Bowel sounds are normal  There is no distension  Palpations: Abdomen is soft  Tenderness:  There is no abdominal tenderness  There is no guarding or rebound  Skin:       General: Skin is warm and dry  Coloration: Skin is not pale  Neurological:        Mental Status: He is alert and oriented to person, place, and time  Cranial Nerves: No cranial nerve deficit  Motor: No abnormal muscle tone     Psychiatric:         Behavior: Behavior normal                     Vital Signs             ED Triage Vitals [03/13/21 2149]       Temperature     Pulse     Respirations     Blood Pressure     SpO2       97 8 °F (36 6 °C)     75     18     (!) 178/92     98 %               Temp Source     Heart Rate Source     Patient Position - Orthostatic VS     BP Location     FiO2 (%)       Oral     Monitor     Lying     Right arm     --               Pain Score                               --                                         Vitals                                                                                                                               Visual Acuity             ED Medications    Medications - No data to display         Diagnostic Studies               Results Reviewed                    Procedure       Component       Value       Units       Date/Time               NT-BNP PRO [[id number]]  (Normal)     Collected: 03/13/21 2230             Lab Status: Final result     Specimen: Blood from Arm, Right     Updated: 03/13/21 2322                   NT-proBNP     31     pg/mL                   Comprehensive metabolic panel [[id number]]  (Abnormal)     Collected: 03/13/21 2230             Lab Status: Final result     Specimen: Blood from Arm, Right     Updated: 03/13/21 2315                   Sodium     143     mmol/L                         Potassium     4 3     mmol/L                         Chloride     105     mmol/L                         CO2     29     mmol/L                         ANION GAP     9     mmol/L                         BUN     11     mg/dL                         Creatinine 1 02     mg/dL                         Glucose     91     mg/dL                         Calcium     9 1     mg/dL                         AST     28     U/L                         ALT     41     U/L                         Alkaline Phosphatase     84     U/L                         Total Protein     8 5High     g/dL                         Albumin     4 1     g/dL                         Total Bilirubin     0 51     mg/dL                         eGFR     88     ml/min/1 73sq m                   Narrative:               [facility] guidelines for Chronic Kidney Disease (CKD):       Stage 1 with normal or high GFR (GFR > 90 mL/min/1 73 square meters)      Stage 2 Mild CKD (GFR = 60-89 mL/min/1 73 square meters)      Stage 3A Moderate CKD (GFR = 45-59 mL/min/1 73 square meters)      Stage 3B Moderate CKD (GFR = 30-44 mL/min/1 73 square meters)      Stage 4 Severe CKD (GFR = 15-29 mL/min/1 73 square meters)      Stage 5 End Stage CKD (GFR <15 mL/min/1 73 square meters)    Note: GFR calculation is accurate only with a steady state creatinine             Troponin I [[id number]]  (Normal)     Collected: 03/13/21 2230             Lab Status: Final result     Specimen: Blood from Arm, Right     Updated: 03/13/21 2315                   Troponin I     <0 02     ng/mL                   CBC and differential [[id number]]  (Abnormal)     Collected: 03/13/21 2230             Lab Status: Final result     Specimen: Blood from Arm, Right     Updated: 03/13/21 2243                   WBC     5 05     Thousand/uL                         RBC     5 06     Million/uL                         Hemoglobin     14 2     g/dL                         Hematocrit     44 0     %                         MCV     87     fL                         MCH     28 1     pg                         MCHC     32 3     g/dL                         RDW     15 3High     %                         MPV     10 4     fL Platelets     197     Thousands/uL                         nRBC     0     /100 WBCs                         Neutrophils Relative     47     %                         Immat GRANS %     0     %                         Lymphocytes Relative     40     %                         Monocytes Relative     8     %                         Eosinophils Relative     4     %                         Basophils Relative     1     %                         Neutrophils Absolute     2 37     Thousands/µL                         Immature Grans Absolute     0 01     Thousand/uL                         Lymphocytes Absolute     2 04     Thousands/µL                         Monocytes Absolute     0 41     Thousand/µL                         Eosinophils Absolute     0 18     Thousand/µL                         Basophils Absolute     0 04     Thousands/µL                                              XR chest 2 views       Final Result by Gladys Marion MD (03/14 1304)       No acute cardiopulmonary disease  Workstation performed: [id number]                                  Procedures    Procedures                   ED Course                                  Identification of Seniors at Risk                    Most Recent Value         (ISAR) Identification of Seniors at Risk       Before the illness or injury that brought you to the Emergency, did you need someone to help you on a regular basis? 0 Filed at: 03/13/2021 2151       In the last 24 hours, have you needed more help than usual?      0 Filed at: 03/13/2021 2151       Have you been hospitalized for one or more nights during the past 6 months? 0 Filed at: 03/13/2021 2151       In general, do you see well?      0 Filed at: 03/13/2021 2151       In general, do you have serious problems with your memory?       0 Filed at: 03/13/2021 2151       Do you take more than three different medications every day?      0 Filed at: 03/13/2021 2151       ISAR Score      0 Filed at: 03/13/2021 2151                                                      SBIRT 22yo+                    Most Recent Value         SBIRT (23 yo +)       In order to provide better care to our patients, we are screening all of our patients for alcohol and drug use  Would it be okay to ask you these screening questions? Yes Filed at: 03/13/2021 2152       Initial Alcohol Screen: US AUDIT-C        1  How often do you have a drink containing alcohol?      0 Filed at: 03/13/2021 2152       2  How many drinks containing alcohol do you have on a typical day you are drinking? 0 Filed at: 03/13/2021 2152       3a  Male UNDER 65: How often do you have five or more drinks on one occasion? 0 Filed at: 03/13/2021 2152       3b  FEMALE Any Age, or MALE 65+: How often do you have 4 or more drinks on one occassion? 0 Filed at: 03/13/2021 2152       Audit-C Score      0 Filed at: 03/13/2021 2152       GRISEL: How many times in the past year have you    Used an illegal drug or used a prescription medication for non-medical reasons?       Never Filed at: 03/13/2021 2152                                                   MDM    Number of Diagnoses or Management Options    Dyspnea:     Diagnosis management comments: 75 yo m with dyspnea, will try neb, check labs, ekg, reasess         Neb has had minimal effect, ekg compared to previous shows new possibly ischemic changes from days ago, will admit               Disposition      Final diagnoses:       Dyspnea                        Time reflects when diagnosis was documented in both MDM as applicable and the Disposition within this note                    Time       User       Action       Codes       Description       Comment               3/13/2021 11:56 PM     Khurram Bautista     Add     [R06 [id number]]     Dyspnea                                           ED Disposition                    ED Disposition       Condition       Date/Time       Comment               Discharge Stable     Sat Mar 13, 2021 11:56 PM     Lawton Cagey discharge to home/self care  Follow-up Information                    Follow up With       Specialties       Details       Why       [address] [facility]     Internal Medicine                 [place] [address]    [phone]                                       Discharge Medication List as of 3/14/2021 12:00 AM                     START taking these medications             Details       albuterol (PROVENTIL HFA,VENTOLIN HFA) 90 mcg/act inhaler     Inhale 2 puffs every 4 (four) hours as needed for wheezing, Starting Sat 3/13/2021, Normal                             CONTINUE these medications which have CHANGED             Details       predniSONE 20 mg tablet     Take 2 tablets (40 mg total) by mouth daily for 5 days, Starting Sat 3/13/2021, Until Thu 3/18/2021, Normal                            No discharge procedures on file                 PDMP Review                None                           ED Provider    Electronically Signed by                        Jeferson Camacho MD    03/21/21 Jose Yates MD  03/22/21 4426

## 2021-03-24 ENCOUNTER — OFFICE VISIT (OUTPATIENT)
Dept: INTERNAL MEDICINE CLINIC | Facility: CLINIC | Age: 66
End: 2021-03-24
Payer: MEDICARE

## 2021-03-24 ENCOUNTER — TELEPHONE (OUTPATIENT)
Dept: ADMINISTRATIVE | Facility: OTHER | Age: 66
End: 2021-03-24

## 2021-03-24 VITALS
HEART RATE: 76 BPM | RESPIRATION RATE: 18 BRPM | SYSTOLIC BLOOD PRESSURE: 110 MMHG | DIASTOLIC BLOOD PRESSURE: 82 MMHG | OXYGEN SATURATION: 99 % | WEIGHT: 206.6 LBS | TEMPERATURE: 98.3 F | BODY MASS INDEX: 29.64 KG/M2

## 2021-03-24 DIAGNOSIS — R19.00 MASS OF SOFT TISSUE OF ABDOMEN: Chronic | ICD-10-CM

## 2021-03-24 DIAGNOSIS — F41.9 ANXIETY: Primary | ICD-10-CM

## 2021-03-24 DIAGNOSIS — H61.22 IMPACTED CERUMEN, LEFT EAR: ICD-10-CM

## 2021-03-24 PROCEDURE — 99495 TRANSJ CARE MGMT MOD F2F 14D: CPT | Performed by: NURSE PRACTITIONER

## 2021-03-24 PROCEDURE — 69210 REMOVE IMPACTED EAR WAX UNI: CPT | Performed by: NURSE PRACTITIONER

## 2021-03-24 RX ORDER — HYDROXYZINE HYDROCHLORIDE 25 MG/1
25 TABLET, FILM COATED ORAL 2 TIMES DAILY PRN
Qty: 30 TABLET | Refills: 1 | Status: SHIPPED | OUTPATIENT
Start: 2021-03-24

## 2021-03-24 NOTE — ASSESSMENT & PLAN NOTE
Patient was evaluated by General surgery and further imaging was ordered  The patient did not have that done  He was provided the number for central scheduling to call and schedule that appointment

## 2021-03-24 NOTE — LETTER
Procedure Request Form: Colonoscopy      Date Requested: 21  Patient: Elsa Ayala  Patient : 1955   Referring Provider: EBEN Perez        Date of Procedure ______________________________       The above patient has informed us that they have completed their   most recent Colonoscopy at your facility  Please complete   this form and attach all corresponding procedure reports/results  Comments Can you please have doctors check there [address] [place] [address] [address] before they became [facility] thankyou!________________________________________________________  ____________________________________________________________________  ____________________________________________________________________  ____________________________________________________________________    Facility Completing Procedure _________________________________________    Form Completed By (print name) _______________________________________      Signature __________________________________________________________      These reports are needed for  compliance    Please fax this completed form and a copy of the procedure report to our office located at [place] [address] as soon as possible to [phone] pa [address]: Phone [phone]    We thank you for your assistance in treating our mutual patient

## 2021-03-24 NOTE — ASSESSMENT & PLAN NOTE
Patient was seen back in January and was noted to have impacted cerumen in his left ear  At that time he was instructed to use Debrox ear drops for 1 week and return to the office  He did use the ear drops but did not come back to the office  Today in the office he still does have impacted wax in his ear  An attempt to remove that with lavage was not successful  I did review with the patient the need to stop using Q-tips to clean his ears  In addition I did refer the patient to ENT

## 2021-03-24 NOTE — TELEPHONE ENCOUNTER
----- Message from Mirella Burgess, [address] sent at 3/24/2021  7:56 AM EDT -----  Regarding: COLONOSCOPY [facility] INTERNAL MED  03/24/21 7:56 AM    Hello, our patient Betty Fried has had CRC: Colonoscopy completed/performed   Please assist in updating the patient chart by making an External outreach to [facility] facility located in [facility] The date of service is per pt states this was about 2 years ago [phone]     Thank you,  Mirella Burgess MA  [facility]  [address]

## 2021-03-24 NOTE — PROGRESS NOTES
INTERNAL MEDICINE TRANSITION OF CARE OFFICE VISIT  [facility]    NAME: Kat Barnett  AGE: 76 y o  SEX: male  : 1955     DATE: 3/24/2021     Assessment and Plan:     Problem List Items Addressed This Visit        Nervous and Auditory    Impacted cerumen, left ear     Patient was seen back in January and was noted to have impacted cerumen in his left ear  At that time he was instructed to use Debrox ear drops for 1 week and return to the office  He did use the ear drops but did not come back to the office  Today in the office he still does have impacted wax in his ear  An attempt to remove that with lavage was not successful  I did review with the patient the need to stop using Q-tips to clean his ears  In addition I did refer the patient to ENT  Relevant Orders    Ear cerumen removal    Ambulatory Referral to Otolaryngology       Other    Mass of soft tissue of abdomen (Chronic)       Patient was evaluated by General surgery and further imaging was ordered  The patient did not have that done  He was provided the number for central scheduling to call and schedule that appointment  Anxiety - Primary       Patient was recently evaluated in the emergency room subsequently hospitalized for questionable chest pain/anxiety  His cardiac workup was negative  He was started on hydroxyzine 25 mg p r n  at bedtime if needed  The patient is having episodes of feeling like he is congested in the middle the night which causes him to be anxious  He believes the hydroxyzine is helping  He will stay on that medication currently  I also recommended to the patient that he purchase a cool mist humidifier as well as using Zyrtec or Claritin as well as Flonase nasal spray  He denies any anxiety symptoms during the day           Relevant Medications    hydrOXYzine HCL (ATARAX) 25 mg tablet           Transitional Care Management Review:     Saundra Kaba Pedro Leyden is a 76 y o  male here for TCM follow-up    During the TCM phone call patient stated:    TCM Call (since 2/21/2021)     Date and time call was made  3/18/2021 11:20 AM    Hospital care reviewed  Records reviewed    Patient was hospitialized at  [facility]        Date of Admission  03/14/21    Date of discharge  03/16/21    Diagnosis  SOB=Orthopnea    Disposition  Home    Were the patients medications reviewed and updated  No    Current Symptoms  Shortness of breath; Cough    Cough Severity  Mild    Shortness of breath severity  Mild      TCM Call (since 2/21/2021)     Post hospital issues  None    Scheduled for follow up? Yes    Did you obtain your prescribed medications  Yes (Comment)  hydrOXYzine HCL    Do you need help managing your prescriptions or medications  No    Is transportation to your appointment needed  No    I have advised the patient to call PCP with any new or worsening symptoms  Elizabeth Gaines LPN    Living Arrangements  Spouse or Significiant other    Are you recieving any outpatient services  No    Are you recieving home care services  No    Interperter language line needed  No    Counseling  Family    Counseling topics  Importance of RX compliance           HPI:       Anna Garcia was initially hospitalized from March 14th to March 16th for shortness of breath issues  Cardiac workup was performed and was negative  It was determined that because of his shortness of breath was more related to anxiety  He was only experiencing this at night  The patient states that he does wake up in the middle the night with some nasal congestion and an inability to take a deep breath which increases his anxiety  He was started on Atarax  The patient does feel better on this medication  He does take it only at bedtime  We will continue it at this time    I did discussion with the patient regarding anxiety and medications that he could take on a daily basis to prevent his anxiety but the patient is not interested  The following portions of the patient's history were reviewed and updated as appropriate: allergies, current medications, past family history, past medical history, past social history, past surgical history and problem list      Review of Systems:     Review of Systems   Constitutional: Negative  Negative for fatigue  HENT: Positive for congestion  Negative for postnasal drip, rhinorrhea and trouble swallowing  Eyes: Negative  Negative for visual disturbance  Respiratory: Negative  Negative for choking and shortness of breath  Cardiovascular: Negative  Negative for chest pain  Gastrointestinal: Negative  Endocrine: Negative  Genitourinary: Negative  Musculoskeletal: Negative  Negative for arthralgias, back pain, myalgias and neck pain  Skin: Negative  Neurological: Negative for dizziness and headaches  Psychiatric/Behavioral: The patient is nervous/anxious  Problem List:     Patient Active Problem List   Diagnosis    Right hip pain    Ventral hernia without obstruction or gangrene    Acute right-sided low back pain without sciatica    Cigarette nicotine dependence without complication    Incarcerated umbilical hernia    Mass of soft tissue of abdomen    Orthopnea        Objective:     /82 (BP Location: Left arm, Patient Position: Sitting, Cuff Size: Standard)   Pulse 76   Temp 98 3 °F (36 8 °C) (Temporal) Comment: NO NSAIDS  Resp 18   Wt 93 7 kg (206 lb 9 6 oz) Comment: w/ shoes denied off  SpO2 99%   BMI 29 64 kg/m²     Physical Exam  Constitutional:       General: He is not in acute distress  Appearance: Normal appearance  He is well-developed  He is obese  HENT:      Head: Normocephalic and atraumatic  Right Ear: Tympanic membrane, ear canal and external ear normal  There is no impacted cerumen  Left Ear: Ear canal and external ear normal  There is impacted cerumen        Nose: Nose normal       Mouth/Throat:      Mouth: Mucous membranes are moist       Pharynx: Oropharynx is clear  No oropharyngeal exudate or posterior oropharyngeal erythema  Eyes:      Pupils: Pupils are equal, round, and reactive to light  Neck:      Musculoskeletal: Normal range of motion  Cardiovascular:      Rate and Rhythm: Normal rate and regular rhythm  Heart sounds: Normal heart sounds  No murmur  Pulmonary:      Effort: Pulmonary effort is normal  No respiratory distress  Breath sounds: Normal breath sounds  No wheezing  Musculoskeletal: Normal range of motion  Skin:     General: Skin is warm and dry  Neurological:      Mental Status: He is alert and oriented to person, place, and time  Psychiatric:         Behavior: Behavior normal          Thought Content: Thought content normal          Judgment: Judgment normal        Ear cerumen removal    Date/Time: 3/24/2021 8:29 AM  Performed by: EBEN Humphrey  Authorized by: EBEN Humphrey   Universal Protocol:  Consent: Verbal consent obtained  Risks and benefits: risks, benefits and alternatives were discussed  Consent given by: patient  Patient understanding: patient states understanding of the procedure being performed  Patient identity confirmed: verbally with patient      Patient location:  Clinic  Procedure details:     Local anesthetic:  None    Location:  L ear    Procedure type: irrigation with instrumentation      Instrumentation: curette      Approach:  External  Post-procedure details:     Complication:  None    Patient tolerance of procedure: Tolerated well, no immediate complications  Comments:      Unsuccessful removal of impacted cerumen  Patient encouraged to stop Q tip usage  Referred to ENT        Laboratory Results: I have personally reviewed the pertinent laboratory results/reports     Radiology/Other Diagnostic Testing Results: I have personally reviewed pertinent reports  Xr Chest 1 View Portable    Result Date: 3/15/2021  CHEST INDICATION:   sob  COMPARISON:  3/13/2021 EXAM PERFORMED/VIEWS:  XR CHEST PORTABLE FINDINGS: Cardiomediastinal silhouette appears unremarkable  The lungs are clear  No pneumothorax or pleural effusion  Osseous structures appear within normal limits for patient age  No acute cardiopulmonary disease  Workstation performed: [id number]        Current Medications:     Outpatient Medications Prior to Visit   Medication Sig Dispense Refill    hydrOXYzine HCL (ATARAX) 25 mg tablet Take 1 tablet (25 mg total) by mouth 2 (two) times a day as needed for itching or anxiety 20 tablet 0     No facility-administered medications prior to visit          EBEN Romo  [facility]

## 2021-03-24 NOTE — ASSESSMENT & PLAN NOTE
Patient was recently evaluated in the emergency room subsequently hospitalized for questionable chest pain/anxiety  His cardiac workup was negative  He was started on hydroxyzine 25 mg p r n  at bedtime if needed  The patient is having episodes of feeling like he is congested in the middle the night which causes him to be anxious  He believes the hydroxyzine is helping  He will stay on that medication currently  I also recommended to the patient that he purchase a cool mist humidifier as well as using Zyrtec or Claritin as well as Flonase nasal spray  He denies any anxiety symptoms during the day

## 2021-03-24 NOTE — PATIENT INSTRUCTIONS
Flonase nasal spray daily  Zyrtec or claritan daily  Call to reschedule ultrasound [phone]    COVID vaccine [id number] ([phone])      Allergic Rhinitis   WHAT YOU NEED TO KNOW:   Allergic rhinitis, or hay fever, is swelling of the inside of your nose  The swelling is a reaction to allergens in the air  An allergen can be anything that causes an allergic reaction  Allergies to weeds, grass, trees, or mold often cause seasonal allergic rhinitis  Indoor dust mites, cockroaches, pet dander, or mold can also cause allergic rhinitis  DISCHARGE INSTRUCTIONS:   Call 911 for the following:   · You have chest pain or shortness of breath  Return to the emergency department if:   · You have severe pain  · You cough up blood  Contact your healthcare provider if:   · You have a fever  · You have ear or sinus pain, or a headache  · Your symptoms get worse, even after treatment  · You have yellow, green, brown, or bloody mucus coming from your nose  · Your nose is bleeding or you have pain inside your nose  · You have trouble sleeping because of your symptoms  · You have questions or concerns about your condition or care  Medicines:   · Medicines  help decrease your symptoms and clear your stuffy nose  · Take your medicine as directed  Contact your healthcare provider if you think your medicine is not helping or if you have side effects  Tell him of her if you are allergic to any medicine  Keep a list of the medicines, vitamins, and herbs you take  Include the amounts, and when and why you take them  Bring the list or the pill bottles to follow-up visits  Carry your medicine list with you in case of an emergency  How to manage allergic rhinitis:  The best way to manage allergic rhinitis is to avoid allergens that can trigger your symptoms   Any of the following may help decrease your symptoms:  · Rinse your nose and sinuses  with a salt water solution or use a salt water nasal spray  This will help thin the mucus in your nose and rinse away pollen and dirt  It will also help reduce swelling so you can breathe normally  Ask your healthcare provider how often to rinse your nose  · Reduce exposure to dust mites  Wash sheets and towels in hot water every week  Cover your pillows and mattresses with allergen-free covers  Limit the number of stuffed animals and soft toys your child has  Wash your child's toys in hot water regularly  Vacuum weekly and use a vacuum  with an air filter  If possible, get rid of carpets and curtains  These collect dust and dust mites  · Reduce exposure to pollen  Keep windows and doors closed in your house and car  Stay inside when air pollution or the pollen count is high  Run your air conditioner on recycle, and change air filters often  Shower and wash your hair before bed every night to rinse away pollen  · Reduce exposure to pet dander  If possible, do not keep cats, dogs, birds, or other pets  If you do keep pets in your home, keep them out of bedrooms and carpeted rooms  Bathe them often  · Reduce exposure to mold  Do not spend time in basements  Choose artificial plants instead of live plants  Keep your home's humidity at less than 45%  Do not have ponds or standing water in your home or yard  · Do not smoke  Avoid others who smoke  Ask your healthcare provider for information if you currently smoke and need help to quit  Follow up with your healthcare provider as directed: You may need to see an allergist often to control your symptoms  Write down your questions so you remember to ask them during your visits  © Copyright [address] Information is for End User's use only and may not be sold, redistributed or otherwise used for commercial purposes   All illustrations and images included in CareNotes® are the copyrighted property of [employer]  or [facility] [employer]   The above information is an  only  It is not intended as medical advice for individual conditions or treatments  Talk to your doctor, nurse or pharmacist before following any medical regimen to see if it is safe and effective for you

## 2021-03-24 NOTE — TELEPHONE ENCOUNTER
Upon review of the In Basket request and the patient's chart, initial outreach has been made via fax, please see Contacts section for details       Thank you  Yvette Zamarripa

## 2021-03-25 ENCOUNTER — TELEPHONE (OUTPATIENT)
Dept: GASTROENTEROLOGY | Facility: CLINIC | Age: 66
End: 2021-03-25

## 2021-03-25 NOTE — TELEPHONE ENCOUNTER
They want to know if this ptn seen Dr Earnestine Rhodes prior to being [facility] and [facility]

## 2021-03-25 NOTE — TELEPHONE ENCOUNTER
According to Tani Morales patient's last colon was done on 8/3/31 by Dr Mary Ramos  Who should we send the report to? Please advise  Thanks

## 2021-03-25 NOTE — TELEPHONE ENCOUNTER
SPOKE TO SOMEONE YESTERDAY, PT NEVER SAW  [facility] NO RECORDS IN ALLSCRIPTS OR IN MEDENT  I ADVISED THEM TO CHECK BARTS OLD RECORDS      CAN YOU CHECK LABTOP

## 2021-03-26 NOTE — TELEPHONE ENCOUNTER
Upon review of the In Basket request we were able to locate, review, and update the patient chart as requested for CRC: Colonoscopy  Any additional questions or concerns should be emailed to the Practice Liaisons via [email or web address] email, please do not reply via In Basket      Thank you  Deonte Diaz

## 2021-04-10 ENCOUNTER — IMMUNIZATIONS (OUTPATIENT)
Dept: FAMILY MEDICINE CLINIC | Facility: HOSPITAL | Age: 66
End: 2021-04-10

## 2021-04-10 DIAGNOSIS — Z23 ENCOUNTER FOR IMMUNIZATION: Primary | ICD-10-CM

## 2021-04-10 PROCEDURE — 91300 SARS-COV-2 / COVID-19 MRNA VACCINE (PFIZER-BIONTECH) 30 MCG: CPT

## 2021-04-10 PROCEDURE — 0001A SARS-COV-2 / COVID-19 MRNA VACCINE (PFIZER-BIONTECH) 30 MCG: CPT

## 2021-05-01 ENCOUNTER — IMMUNIZATIONS (OUTPATIENT)
Dept: FAMILY MEDICINE CLINIC | Facility: HOSPITAL | Age: 66
End: 2021-05-01

## 2021-05-01 DIAGNOSIS — Z23 ENCOUNTER FOR IMMUNIZATION: Primary | ICD-10-CM

## 2021-05-01 PROCEDURE — 0002A SARS-COV-2 / COVID-19 MRNA VACCINE (PFIZER-BIONTECH) 30 MCG: CPT

## 2021-05-01 PROCEDURE — 91300 SARS-COV-2 / COVID-19 MRNA VACCINE (PFIZER-BIONTECH) 30 MCG: CPT

## 2022-01-11 ENCOUNTER — IMMUNIZATIONS (OUTPATIENT)
Dept: FAMILY MEDICINE CLINIC | Facility: HOSPITAL | Age: 67
End: 2022-01-11

## 2022-01-11 DIAGNOSIS — Z23 ENCOUNTER FOR IMMUNIZATION: Primary | ICD-10-CM

## 2022-01-11 PROCEDURE — 91300 COVID-19 PFIZER VACC 0.3 ML: CPT

## 2022-01-11 PROCEDURE — 0001A COVID-19 PFIZER VACC 0.3 ML: CPT

## 2022-02-12 ENCOUNTER — HOSPITAL ENCOUNTER (EMERGENCY)
Facility: HOSPITAL | Age: 67
Discharge: HOME | End: 2022-02-12
Attending: EMERGENCY MEDICINE | Admitting: EMERGENCY MEDICINE
Payer: MEDICARE

## 2022-02-12 VITALS
DIASTOLIC BLOOD PRESSURE: 88 MMHG | HEIGHT: 72 IN | BODY MASS INDEX: 26.82 KG/M2 | RESPIRATION RATE: 18 BRPM | HEART RATE: 77 BPM | TEMPERATURE: 97.9 F | OXYGEN SATURATION: 100 % | WEIGHT: 198 LBS | SYSTOLIC BLOOD PRESSURE: 197 MMHG

## 2022-02-12 DIAGNOSIS — H61.22 IMPACTED CERUMEN OF LEFT EAR: Primary | ICD-10-CM

## 2022-02-12 PROCEDURE — 99282 EMERGENCY DEPT VISIT SF MDM: CPT

## 2022-02-12 PROCEDURE — 69210 REMOVE IMPACTED EAR WAX UNI: CPT | Performed by: PHYSICIAN ASSISTANT

## 2022-02-12 PROCEDURE — 99284 EMERGENCY DEPT VISIT MOD MDM: CPT | Performed by: PHYSICIAN ASSISTANT

## 2022-02-12 RX ADMIN — CARBAMIDE PEROXIDE - 6.5% 5 DROP: 65 SOLUTION/ DROPS TOPICAL at 21:56

## 2022-02-13 NOTE — ED PROVIDER NOTES
History  Chief Complaint   Patient presents with    Earache     Pt states that he thinks he has an ear infection, he states it feels clogged up with water  Patient is a 62-year-old male with no significant past medical history presents to the emergency department for evaluation of a clogged left ear  Patient states that this has been ongoing for a week  He feels like there is water stuck in his ear  He is not having any ear pain  No fevers, chills, chest pain, difficulty breathing  No decreased hearing  No other complaints at this time  Prior to Admission Medications   Prescriptions Last Dose Informant Patient Reported? Taking?   hydrOXYzine HCL (ATARAX) 25 mg tablet   No No   Sig: Take 1 tablet (25 mg total) by mouth 2 (two) times a day as needed for itching or anxiety      Facility-Administered Medications: None       History reviewed  No pertinent past medical history  History reviewed  No pertinent surgical history  Family History   Problem Relation Age of Onset    No Known Problems Mother     No Known Problems Father     No Known Problems Brother     No Known Problems Maternal Grandmother     No Known Problems Maternal Grandfather     No Known Problems Paternal Grandmother     No Known Problems Paternal Grandfather      I have reviewed and agree with the history as documented  E-Cigarette/Vaping    E-Cigarette Use Never User      E-Cigarette/Vaping Substances    Nicotine No     THC No     CBD No     Flavoring No     Other No     Unknown No      Social History     Tobacco Use    Smoking status: Current Every Day Smoker     Packs/day: 0 25     Years: 50 00     Pack years: 12 50     Types: Cigarettes    Smokeless tobacco: Never Used    Tobacco comment: 2-3 cigarettes a day   Vaping Use    Vaping Use: Never used   Substance Use Topics    Alcohol use:  Yes     Alcohol/week: 6 0 standard drinks     Types: 3 Glasses of wine, 3 Cans of beer per week     Comment: once a week    Drug use: Never       Review of Systems   Constitutional: Negative for chills, diaphoresis and fever  HENT: Positive for ear pain ("Clogged sensation")  Negative for congestion, drooling, facial swelling, nosebleeds, sore throat and voice change  Eyes: Negative for discharge and redness  Respiratory: Negative for cough, choking, chest tightness, shortness of breath and stridor  Cardiovascular: Negative for chest pain and palpitations  Gastrointestinal: Negative for abdominal pain, diarrhea, nausea and vomiting  Genitourinary: Negative for decreased urine volume, difficulty urinating, dysuria, flank pain, frequency and urgency  Musculoskeletal: Negative for arthralgias, back pain, neck pain and neck stiffness  Skin: Negative for color change, rash and wound  Neurological: Negative for dizziness, syncope, facial asymmetry, weakness, light-headedness, numbness and headaches  Psychiatric/Behavioral: Negative for confusion and suicidal ideas  The patient is not nervous/anxious  All other systems reviewed and are negative  Physical Exam  Physical Exam  Vitals reviewed  Constitutional:       General: He is not in acute distress  Appearance: Normal appearance  He is normal weight  He is not ill-appearing, toxic-appearing or diaphoretic  HENT:      Head: Normocephalic and atraumatic  Right Ear: External ear normal       Left Ear: Ear canal and external ear normal  There is impacted cerumen  Mouth/Throat:      Mouth: Mucous membranes are moist       Pharynx: Oropharynx is clear  No oropharyngeal exudate or posterior oropharyngeal erythema  Eyes:      General: No scleral icterus  Right eye: No discharge  Left eye: No discharge  Extraocular Movements: Extraocular movements intact  Conjunctiva/sclera: Conjunctivae normal       Pupils: Pupils are equal, round, and reactive to light     Cardiovascular:      Rate and Rhythm: Normal rate and regular rhythm  Pulses: Normal pulses  Heart sounds: Normal heart sounds  No murmur heard  No friction rub  No gallop  Pulmonary:      Effort: Pulmonary effort is normal  No respiratory distress  Breath sounds: Normal breath sounds  No stridor  No wheezing, rhonchi or rales  Abdominal:      General: Abdomen is flat  Palpations: Abdomen is soft  Tenderness: There is no abdominal tenderness  There is no right CVA tenderness, left CVA tenderness, guarding or rebound  Musculoskeletal:         General: Normal range of motion  Cervical back: Normal range of motion and neck supple  Right lower leg: No edema  Left lower leg: No edema  Skin:     General: Skin is warm and dry  Capillary Refill: Capillary refill takes less than 2 seconds  Neurological:      General: No focal deficit present  Mental Status: He is alert and oriented to person, place, and time  Psychiatric:         Mood and Affect: Mood normal          Behavior: Behavior normal          Vital Signs  ED Triage Vitals [02/12/22 2130]   Temperature Pulse Respirations Blood Pressure SpO2   97 9 °F (36 6 °C) 77 18 (!) 197/88 100 %      Temp Source Heart Rate Source Patient Position - Orthostatic VS BP Location FiO2 (%)   Oral Monitor Sitting Left arm --      Pain Score       --           Vitals:    02/12/22 2130   BP: (!) 197/88   Pulse: 77   Patient Position - Orthostatic VS: Sitting         Visual Acuity      ED Medications  Medications   carbamide peroxide (DEBROX) 6 5 % otic solution 5 drop (5 drops Left Ear Given 2/12/22 2156)       Diagnostic Studies  Results Reviewed     None                 No orders to display              Procedures  Procedures         ED Course                               SBIRT 20yo+      Most Recent Value   SBIRT (24 yo +)    In order to provide better care to our patients, we are screening all of our patients for alcohol and drug use   Would it be okay to ask you these screening questions? Yes Filed at: 02/12/2022 2137   Initial Alcohol Screen: US AUDIT-C     1  How often do you have a drink containing alcohol? 0 Filed at: 02/12/2022 2137   2  How many drinks containing alcohol do you have on a typical day you are drinking? 0 Filed at: 02/12/2022 2137   3a  Male UNDER 65: How often do you have five or more drinks on one occasion? 0 Filed at: 02/12/2022 2137   3b  FEMALE Any Age, or MALE 65+: How often do you have 4 or more drinks on one occassion? 0 Filed at: 02/12/2022 2137   Audit-C Score 0 Filed at: 02/12/2022 2137   GRISEL: How many times in the past year have you    Used an illegal drug or used a prescription medication for non-medical reasons? Never Filed at: 02/12/2022 2137                    MDM  Number of Diagnoses or Management Options  Impacted cerumen of left ear  Diagnosis management comments: Patient presenting for evaluation of a clogged sensation his left ear  He appears comfortable  He is not any acute distress  Vital signs remarkable for hypertension at 197/88  Remainder of vital signs unremarkable  On exam, he does have a cerumen impaction of the left ear  I tried to remove cerumen with curette, however cerumen was very hard and unable to be removed  Debrox drops were administered and removal was again attempted without success  Patient was discharged home with the remainder of Debrox and instructed to use it daily until he can see ENT for cerumen impaction removal   Patient is agreeable with this plan  Patient discharged home in a stable condition  Strict return precautions were discussed      Patient Progress  Patient progress: stable      Disposition  Final diagnoses:   Impacted cerumen of left ear     Time reflects when diagnosis was documented in both MDM as applicable and the Disposition within this note     Time User Action Codes Description Comment    2/12/2022 10:41 PM Jagjit Landrum Add [H61 22] Impacted cerumen of left ear       ED Disposition ED Disposition Condition Date/Time Comment    Discharge Stable Sat Feb 12, 2022 10:41 PM Jerry Panchal discharge to home/self care  Follow-up Information     Follow up With Specialties Details Why Contact Info Additional [address] [facility] Emergency Department Emergency Medicine Go to  If symptoms worsen [address] [facility] [id number] [facility] Emergency Department, [address] [place], [place], [address] Throat Otolaryngology Schedule an appointment as soon as possible for a visit  ASAP for follow up [address] [facility], [address] [facility], [address] [place], [address]          Discharge Medication List as of 2/12/2022 10:42 PM      CONTINUE these medications which have NOT CHANGED    Details   hydrOXYzine HCL (ATARAX) 25 mg tablet Take 1 tablet (25 mg total) by mouth 2 (two) times a day as needed for itching or anxiety, Starting Wed 3/24/2021, Normal             No discharge procedures on file      PDMP Review     None          ED Provider  Electronically Signed by           Jose Donovan, PAELIZA  02/17/22 3089

## 2022-10-12 PROBLEM — H61.22 IMPACTED CERUMEN, LEFT EAR: Status: RESOLVED | Noted: 2021-03-24 | Resolved: 2022-10-12

## 2024-10-02 ENCOUNTER — HOSPITAL ENCOUNTER (EMERGENCY)
Facility: HOSPITAL | Age: 69
Discharge: HOME | End: 2024-10-02
Attending: EMERGENCY MEDICINE
Payer: COMMERCIAL

## 2024-10-02 VITALS
DIASTOLIC BLOOD PRESSURE: 86 MMHG | RESPIRATION RATE: 19 BRPM | TEMPERATURE: 97.6 F | OXYGEN SATURATION: 99 % | HEART RATE: 90 BPM | SYSTOLIC BLOOD PRESSURE: 165 MMHG

## 2024-10-02 DIAGNOSIS — M54.41 ACUTE RIGHT-SIDED LOW BACK PAIN WITH RIGHT-SIDED SCIATICA: ICD-10-CM

## 2024-10-02 DIAGNOSIS — M54.31 SCIATICA OF RIGHT SIDE: Primary | ICD-10-CM

## 2024-10-02 PROCEDURE — 99283 EMERGENCY DEPT VISIT LOW MDM: CPT

## 2024-10-02 PROCEDURE — 99284 EMERGENCY DEPT VISIT MOD MDM: CPT | Performed by: EMERGENCY MEDICINE

## 2024-10-02 PROCEDURE — 96372 THER/PROPH/DIAG INJ SC/IM: CPT

## 2024-10-02 RX ORDER — LIDOCAINE 50 MG/G
1 PATCH TOPICAL DAILY PRN
Qty: 30 PATCH | Refills: 0 | Status: SHIPPED | OUTPATIENT
Start: 2024-10-02

## 2024-10-02 RX ORDER — KETOROLAC TROMETHAMINE 30 MG/ML
15 INJECTION, SOLUTION INTRAMUSCULAR; INTRAVENOUS ONCE
Status: COMPLETED | OUTPATIENT
Start: 2024-10-02 | End: 2024-10-02

## 2024-10-02 RX ORDER — LIDOCAINE 50 MG/G
1 PATCH TOPICAL ONCE
Status: DISCONTINUED | OUTPATIENT
Start: 2024-10-02 | End: 2024-10-02 | Stop reason: HOSPADM

## 2024-10-02 RX ADMIN — LIDOCAINE 1 PATCH: 700 PATCH TOPICAL at 14:01

## 2024-10-02 RX ADMIN — KETOROLAC TROMETHAMINE 15 MG: 30 INJECTION, SOLUTION INTRAMUSCULAR at 14:01

## 2024-10-02 NOTE — ED PROVIDER NOTES
Final diagnoses:   Sciatica of right side   Acute right-sided low back pain with right-sided sciatica     ED Disposition       ED Disposition   Discharge    Condition   Stable    Date/Time   Wed Oct 2, 2024  2:39 PM    Comment   Kobe Franklin discharge to home/self care.                   Assessment & Plan       Medical Decision Making  Patient is a pleasant 69-year-old male who presents with 3 days of right-sided lower back pain radiating into his buttock and lower extremity.  He reports associated paresthesias, however is ambulatory with only mildly decreased range of motion due to pain.  No red flag signs or symptoms suggestive of cauda equina.  Clinical presentation most consistent with sciatica.  No significant injury or trauma to suggest bony injury.  Plan to treat symptomatically in the emergency department and reevaluate    Risk  Prescription drug management.        ED Course as of 10/02/24 2039   Wed Oct 02, 2024   1435 Symptomatically improving. Anticipatory guidance and return precautions provided. Work note requested. Stable for d/c       Medications   ketorolac (TORADOL) injection 15 mg (15 mg Intramuscular Given 10/2/24 1401)       ED Risk Strat Scores                           SBIRT 22yo+      Flowsheet Row Most Recent Value   Initial Alcohol Screen: US AUDIT-C     1. How often do you have a drink containing alcohol? 0 Filed at: 10/02/2024 1345   2. How many drinks containing alcohol do you have on a typical day you are drinking?  0 Filed at: 10/02/2024 1345   3a. Male UNDER 65: How often do you have five or more drinks on one occasion? 0 Filed at: 10/02/2024 1345   Audit-C Score 0 Filed at: 10/02/2024 1345   GRISEL: How many times in the past year have you...    Used an illegal drug or used a prescription medication for non-medical reasons? Never Filed at: 10/02/2024 1345                            History of Present Illness       Chief Complaint   Patient presents with    Leg Pain     Right buttocks  to low back pain shooting down right leg with numbness and tingling to right foot for two days.        History reviewed. No pertinent past medical history.   History reviewed. No pertinent surgical history.   Family History   Problem Relation Age of Onset    No Known Problems Mother     No Known Problems Father     No Known Problems Brother     No Known Problems Maternal Grandmother     No Known Problems Maternal Grandfather     No Known Problems Paternal Grandmother     No Known Problems Paternal Grandfather       Social History     Tobacco Use    Smoking status: Every Day     Current packs/day: 0.25     Average packs/day: 0.3 packs/day for 50.0 years (12.5 ttl pk-yrs)     Types: Cigarettes    Smokeless tobacco: Never    Tobacco comments:     2-3 cigarettes a day   Vaping Use    Vaping status: Never Used   Substance Use Topics    Alcohol use: Yes     Alcohol/week: 6.0 standard drinks of alcohol     Types: 3 Glasses of wine, 3 Cans of beer per week     Comment: once a week    Drug use: Never      E-Cigarette/Vaping    E-Cigarette Use Never User       E-Cigarette/Vaping Substances    Nicotine No     THC No     CBD No     Flavoring No     Other No     Unknown No       I have reviewed and agree with the history as documented.       Back Pain  Location:  Lumbar spine and gluteal region  Quality:  Shooting  Pain severity:  Moderate  Timing:  Intermittent  Progression:  Waxing and waning  Context: lifting heavy objects    Context: not physical stress, not recent illness and not recent injury    Worsened by:  Bending  Associated symptoms: leg pain and paresthesias    Associated symptoms: no bladder incontinence, no perianal numbness, no tingling and no weakness        Review of Systems   Genitourinary:  Negative for bladder incontinence.   Musculoskeletal:  Positive for back pain.   Neurological:  Positive for paresthesias. Negative for tingling and weakness.           Objective       ED Triage Vitals   Temperature Pulse  Blood Pressure Respirations SpO2 Patient Position - Orthostatic VS   10/02/24 1323 10/02/24 1323 10/02/24 1323 10/02/24 1323 10/02/24 1323 10/02/24 1323   97.6 °F (36.4 °C) 90 165/86 19 99 % Sitting      Temp Source Heart Rate Source BP Location FiO2 (%) Pain Score    10/02/24 1323 10/02/24 1323 10/02/24 1323 -- 10/02/24 1401    Temporal Monitor Left arm  Med Not Given for Pain - for MAR use only      Vitals      Date and Time Temp Pulse SpO2 Resp BP Pain Score FACES Pain Rating User   10/02/24 1431 -- -- -- -- -- 5 -- SG   10/02/24 1401 -- -- -- -- -- Med Not Given for Pain - for MAR use only -- RS   10/02/24 1323 97.6 °F (36.4 °C) 90 99 % 19 165/86 -- -- MOISE            Physical Exam  Vitals and nursing note reviewed.   Constitutional:       General: He is not in acute distress.     Appearance: Normal appearance.   HENT:      Head: Normocephalic and atraumatic.      Right Ear: External ear normal.      Left Ear: External ear normal.      Nose: Nose normal.   Cardiovascular:      Rate and Rhythm: Normal rate.   Pulmonary:      Effort: Pulmonary effort is normal. No respiratory distress.   Musculoskeletal:         General: No swelling.      Lumbar back: Spasms and tenderness present. No bony tenderness. Positive right straight leg raise test.      Right lower leg: No edema.      Left lower leg: No edema.   Skin:     General: Skin is warm and dry.   Neurological:      General: No focal deficit present.      Mental Status: He is alert and oriented to person, place, and time. Mental status is at baseline.   Psychiatric:         Behavior: Behavior normal.         Results Reviewed       None            No orders to display       Procedures    ED Medication and Procedure Management   Prior to Admission Medications   Prescriptions Last Dose Informant Patient Reported? Taking?   hydrOXYzine HCL (ATARAX) 25 mg tablet   No No   Sig: Take 1 tablet (25 mg total) by mouth 2 (two) times a day as needed for itching or anxiety       Facility-Administered Medications: None     Discharge Medication List as of 10/2/2024  2:41 PM        START taking these medications    Details   lidocaine (Lidoderm) 5 % Apply 1 patch topically over 12 hours daily as needed (back pain) Remove & Discard patch within 12 hours or as directed by MD, Starting Wed 10/2/2024, Normal      tiZANidine (ZANAFLEX) 4 mg tablet Take 1 tablet (4 mg total) by mouth every 8 (eight) hours as needed for muscle spasms, Starting Wed 10/2/2024, Normal           CONTINUE these medications which have NOT CHANGED    Details   hydrOXYzine HCL (ATARAX) 25 mg tablet Take 1 tablet (25 mg total) by mouth 2 (two) times a day as needed for itching or anxiety, Starting Wed 3/24/2021, Normal           No discharge procedures on file.  ED SEPSIS DOCUMENTATION   Time reflects when diagnosis was documented in both MDM as applicable and the Disposition within this note       Time User Action Codes Description Comment    10/2/2024  2:39 PM Yumiko Romero Add [M54.31] Sciatica of right side     10/2/2024  2:40 PM Yumiko Romero Add [M54.41] Acute right-sided low back pain with right-sided sciatica                  Yumiko Romero MD  10/02/24 2039

## 2024-10-02 NOTE — Clinical Note
Kobe Franklin was seen and treated in our emergency department on 10/2/2024.                Diagnosis:     Kobe  may return to work on return date.    He may return on this date: 10/07/2024         If you have any questions or concerns, please don't hesitate to call.      Yumiko Romero MD    ______________________________           _______________          _______________  Hospital Representative                              Date                                Time